# Patient Record
Sex: MALE | Race: BLACK OR AFRICAN AMERICAN | NOT HISPANIC OR LATINO | Employment: OTHER | URBAN - METROPOLITAN AREA
[De-identification: names, ages, dates, MRNs, and addresses within clinical notes are randomized per-mention and may not be internally consistent; named-entity substitution may affect disease eponyms.]

---

## 2023-06-16 ENCOUNTER — TELEPHONE (OUTPATIENT)
Dept: PULMONOLOGY | Facility: CLINIC | Age: 68
End: 2023-06-16
Payer: OTHER GOVERNMENT

## 2023-06-16 NOTE — TELEPHONE ENCOUNTER
Attempted to return no answer left voicemail to return call.LB  ----- Message from Velma Rai sent at 6/16/2023  9:03 AM CDT -----  Contact: Ilana([facility])  Ilana called to consult with nurse or staff regarding an authorization for the patient. She wanted to verify if the authorization has been received so the patient can get scheduled for an appointment. She would like a call back and can be reached at [phone]. Thanks/MR

## 2023-06-22 ENCOUNTER — HOSPITAL ENCOUNTER (OUTPATIENT)
Dept: RADIOLOGY | Facility: HOSPITAL | Age: 68
Discharge: HOME | End: 2023-06-22
Attending: SURGERY
Payer: OTHER GOVERNMENT

## 2023-06-22 ENCOUNTER — HOSPITAL ENCOUNTER (OUTPATIENT)
Dept: PREADMISSION TESTING | Facility: HOSPITAL | Age: 68
Discharge: HOME | End: 2023-06-22
Payer: MEDICARE

## 2023-06-22 VITALS — BODY MASS INDEX: 25.88 KG/M2 | HEIGHT: 66 IN | WEIGHT: 161 LBS

## 2023-06-22 DIAGNOSIS — Z12.11 COLON CANCER SCREENING: Primary | ICD-10-CM

## 2023-06-22 PROCEDURE — 93010 EKG 12-LEAD: ICD-10-PCS | Mod: ,,, | Performed by: INTERNAL MEDICINE

## 2023-06-22 PROCEDURE — 71046 X-RAY EXAM CHEST 2 VIEWS: CPT | Mod: TC

## 2023-06-22 PROCEDURE — 93010 ELECTROCARDIOGRAM REPORT: CPT | Mod: ,,, | Performed by: INTERNAL MEDICINE

## 2023-06-22 PROCEDURE — 93005 ELECTROCARDIOGRAM TRACING: CPT

## 2023-06-22 RX ORDER — SODIUM CHLORIDE, SODIUM LACTATE, POTASSIUM CHLORIDE, CALCIUM CHLORIDE 600; 310; 30; 20 MG/100ML; MG/100ML; MG/100ML; MG/100ML
INJECTION, SOLUTION INTRAVENOUS CONTINUOUS
Status: CANCELLED | OUTPATIENT
Start: 2023-06-28

## 2023-06-22 NOTE — DISCHARGE INSTRUCTIONS

## 2023-06-26 ENCOUNTER — TELEPHONE (OUTPATIENT)
Dept: PULMONOLOGY | Facility: CLINIC | Age: 68
End: 2023-06-26
Payer: OTHER GOVERNMENT

## 2023-06-26 NOTE — TELEPHONE ENCOUNTER
Attempted to return call while leaving voicemail patient picked up.LB  ----- Message from Martina Jaeger sent at 6/26/2023  1:36 PM CDT -----  Type:  Needs Medical Advice    Who Called: Zeferino Mclain Jr.  Symptoms (please be specific): -   How long has patient had these symptoms:  -  Pharmacy name and phone #:  -  Would the patient rather a call back or a response via [facility]?    Best Call Back Number: [phone]    Additional Information: pt would like to make appt. Please call

## 2023-06-27 ENCOUNTER — ANESTHESIA EVENT (OUTPATIENT)
Dept: GASTROENTEROLOGY | Facility: HOSPITAL | Age: 68
End: 2023-06-27
Payer: OTHER GOVERNMENT

## 2023-06-27 DIAGNOSIS — R91.1 LUNG NODULE: Primary | ICD-10-CM

## 2023-06-27 NOTE — ANESTHESIA PREPROCEDURE EVALUATION
06/27/2023  Zeferino Mclain Jr. is a 67 y.o., male.      Pre-op Assessment    I have reviewed the Patient Summary Reports.     I have reviewed the Nursing Notes. I have reviewed the NPO Status.   I have reviewed the Medications.     Review of Systems  Anesthesia Hx:  No problems with previous Anesthesia  Denies Family Hx of Anesthesia complications.   Denies Personal Hx of Anesthesia complications.   Hematology/Oncology:  Hematology Normal   Oncology Normal     EENT/Dental:EENT/Dental Normal   Cardiovascular:  Cardiovascular Normal Exercise tolerance: good     Pulmonary:  Pulmonary Normal    Renal/:  Renal/ Normal     Hepatic/GI:  Hepatic/GI Normal    Musculoskeletal:  Musculoskeletal Normal    Neurological:  Neurology Normal    Endocrine:  Endocrine Normal    Dermatological:  Skin Normal    Psych:  Psychiatric Normal           Physical Exam  General: Well nourished, Cooperative, Alert and Oriented    Airway:  Mallampati: II / II  Mouth Opening: Normal  TM Distance: Normal  Tongue: Normal  Neck ROM: Normal ROM    Dental:  Edentulous        Anesthesia Plan  Type of Anesthesia, risks & benefits discussed:    Anesthesia Type: MAC  Intra-op Monitoring Plan: Standard ASA Monitors  Post Op Pain Control Plan:   (medical reason for not using multimodal pain management)  Induction:  IV  Informed Consent: Patient consented to blood products? No  ASA Score: 3  Day of Surgery Review of History & Physical: H&P Update referred to the surgeon/provider.I have interviewed and examined the patient. I have reviewed the patient's H&P dated: There are no significant changes. H&P completed by Anesthesiologist.    Ready For Surgery From Anesthesia Perspective.     .

## 2023-06-28 ENCOUNTER — HOSPITAL ENCOUNTER (OUTPATIENT)
Dept: GASTROENTEROLOGY | Facility: HOSPITAL | Age: 68
Discharge: HOME | End: 2023-06-28
Attending: SURGERY
Payer: OTHER GOVERNMENT

## 2023-06-28 ENCOUNTER — ANESTHESIA (OUTPATIENT)
Dept: GASTROENTEROLOGY | Facility: HOSPITAL | Age: 68
End: 2023-06-28
Payer: OTHER GOVERNMENT

## 2023-06-28 VITALS
SYSTOLIC BLOOD PRESSURE: 130 MMHG | RESPIRATION RATE: 18 BRPM | BODY MASS INDEX: 25.88 KG/M2 | DIASTOLIC BLOOD PRESSURE: 57 MMHG | HEART RATE: 43 BPM | OXYGEN SATURATION: 99 % | HEIGHT: 66 IN | WEIGHT: 161 LBS | TEMPERATURE: 97 F

## 2023-06-28 DIAGNOSIS — Z12.11 COLON CANCER SCREENING: Primary | ICD-10-CM

## 2023-06-28 PROCEDURE — 37000009 HC ANESTHESIA EA ADD 15 MINS

## 2023-06-28 PROCEDURE — 63600175 PHARM REV CODE 636 W HCPCS: Performed by: NURSE ANESTHETIST, CERTIFIED REGISTERED

## 2023-06-28 PROCEDURE — 00811 ANES LWR INTST NDSC NOS: CPT

## 2023-06-28 PROCEDURE — 37000008 HC ANESTHESIA 1ST 15 MINUTES

## 2023-06-28 PROCEDURE — 25000003 PHARM REV CODE 250: Performed by: NURSE ANESTHETIST, CERTIFIED REGISTERED

## 2023-06-28 PROCEDURE — D9220A PRA ANESTHESIA: Mod: 33,,, | Performed by: NURSE ANESTHETIST, CERTIFIED REGISTERED

## 2023-06-28 PROCEDURE — 45385 COLONOSCOPY W/LESION REMOVAL: CPT | Mod: PT | Performed by: SURGERY

## 2023-06-28 PROCEDURE — D9220A PRA ANESTHESIA: ICD-10-PCS | Mod: 33,,, | Performed by: NURSE ANESTHETIST, CERTIFIED REGISTERED

## 2023-06-28 PROCEDURE — 63600175 PHARM REV CODE 636 W HCPCS: Performed by: SURGERY

## 2023-06-28 RX ORDER — SODIUM CHLORIDE, SODIUM LACTATE, POTASSIUM CHLORIDE, CALCIUM CHLORIDE 600; 310; 30; 20 MG/100ML; MG/100ML; MG/100ML; MG/100ML
INJECTION, SOLUTION INTRAVENOUS CONTINUOUS
Status: DISCONTINUED | OUTPATIENT
Start: 2023-06-28 | End: 2023-06-29 | Stop reason: HOSPADM

## 2023-06-28 RX ORDER — SODIUM CHLORIDE 9 MG/ML
INJECTION, SOLUTION INTRAVENOUS CONTINUOUS
Status: DISCONTINUED | OUTPATIENT
Start: 2023-06-28 | End: 2023-06-29 | Stop reason: HOSPADM

## 2023-06-28 RX ORDER — PROPOFOL 10 MG/ML
VIAL (ML) INTRAVENOUS
Status: DISCONTINUED | OUTPATIENT
Start: 2023-06-28 | End: 2023-06-28

## 2023-06-28 RX ORDER — LIDOCAINE HYDROCHLORIDE 20 MG/ML
INJECTION INTRAVENOUS
Status: DISCONTINUED | OUTPATIENT
Start: 2023-06-28 | End: 2023-06-28

## 2023-06-28 RX ADMIN — PROPOFOL 150 MG: 10 INJECTION, EMULSION INTRAVENOUS at 11:06

## 2023-06-28 RX ADMIN — LIDOCAINE HYDROCHLORIDE 100 MG: 20 INJECTION, SOLUTION INTRAVENOUS at 11:06

## 2023-06-28 RX ADMIN — SODIUM CHLORIDE, POTASSIUM CHLORIDE, SODIUM LACTATE AND CALCIUM CHLORIDE: 600; 310; 30; 20 INJECTION, SOLUTION INTRAVENOUS at 08:06

## 2023-06-28 NOTE — ANESTHESIA POSTPROCEDURE EVALUATION
Anesthesia Post Evaluation    Patient: Zeferino Mclain Jr.    Procedure(s) Performed: * No procedures listed *    Final Anesthesia Type: MAC      Patient location during evaluation: floor  Patient participation: Yes- Able to Participate  Level of consciousness: awake and alert, awake and oriented  Post-procedure vital signs: reviewed and stable  Pain management: adequate  Airway patency: patent    PONV status at discharge: No PONV  Anesthetic complications: no      Cardiovascular status: blood pressure returned to baseline  Respiratory status: unassisted, room air and spontaneous ventilation  Hydration status: euvolemic  Follow-up not needed.          Vitals Value Taken Time   /90 06/28/23 0825   Temp 36 °C (96.8 °F) 06/28/23 0825   Pulse 56 06/28/23 0825   Resp 18 06/28/23 0825   SpO2 97 % 06/28/23 0825         No case tracking events are documented in the log.      Pain/Breanna Score: No data recorded

## 2023-06-28 NOTE — PLAN OF CARE
Returned to room per stretcher in good condition. Awake and alert. No complaints. Passing gas, iced water given and tolerating well.

## 2023-06-28 NOTE — DISCHARGE INSTRUCTIONS
Follow-up with Dr Tracy as scheduled    Diet: as tolerated    Activity:  decrease activity today, no driving today, resume all activity tomorrow    Notify MD:  increased swelling of abdomen, excessive nausea/vomiting, excessive bright red bleeding from rectum    Medications:  continue your home medications. Keep a list of your home medications at all times for emergencies.

## 2023-07-03 NOTE — DISCHARGE SUMMARY
[facility]  Discharge Note  Short Stay    Colonoscopy      OUTCOME: Patient tolerated treatment/procedure well without complication and is now ready for discharge.    DISPOSITION: Home or Self Care    FINAL DIAGNOSIS:  Colon cancer screening    FOLLOWUP: In clinic    DISCHARGE INSTRUCTIONS:    Discharge Procedure Orders   Diet general         Clinical Reference Documents Added to Patient Instructions         Document    COLONOSCOPY (ENGLISH)            TIME SPENT ON DISCHARGE: 5 minutes

## 2023-07-05 ENCOUNTER — CLINICAL SUPPORT (OUTPATIENT)
Dept: PULMONOLOGY | Facility: CLINIC | Age: 68
End: 2023-07-05
Payer: OTHER GOVERNMENT

## 2023-07-05 ENCOUNTER — OFFICE VISIT (OUTPATIENT)
Dept: PULMONOLOGY | Facility: CLINIC | Age: 68
End: 2023-07-05
Payer: OTHER GOVERNMENT

## 2023-07-05 ENCOUNTER — TELEPHONE (OUTPATIENT)
Dept: PULMONOLOGY | Facility: CLINIC | Age: 68
End: 2023-07-05

## 2023-07-05 VITALS
WEIGHT: 162 LBS | HEART RATE: 56 BPM | DIASTOLIC BLOOD PRESSURE: 70 MMHG | HEIGHT: 66 IN | SYSTOLIC BLOOD PRESSURE: 178 MMHG | RESPIRATION RATE: 17 BRPM | BODY MASS INDEX: 26.03 KG/M2 | OXYGEN SATURATION: 17 %

## 2023-07-05 DIAGNOSIS — R91.8 PULMONARY NODULES/LESIONS, MULTIPLE: Primary | ICD-10-CM

## 2023-07-05 DIAGNOSIS — R91.1 SOLITARY PULMONARY NODULE: ICD-10-CM

## 2023-07-05 DIAGNOSIS — J47.9 FOCAL BRONCHIECTASIS: ICD-10-CM

## 2023-07-05 DIAGNOSIS — Z87.891 HISTORY OF TOBACCO USE: ICD-10-CM

## 2023-07-05 DIAGNOSIS — R91.1 LUNG NODULE: ICD-10-CM

## 2023-07-05 PROCEDURE — 94729 DIFFUSING CAPACITY: CPT | Mod: S$GLB,,, | Performed by: INTERNAL MEDICINE

## 2023-07-05 PROCEDURE — 94726 PULM FUNCT TST PLETHYSMOGRAP: ICD-10-PCS | Mod: S$GLB,,, | Performed by: INTERNAL MEDICINE

## 2023-07-05 PROCEDURE — 94060 EVALUATION OF WHEEZING: CPT | Mod: S$GLB,,, | Performed by: INTERNAL MEDICINE

## 2023-07-05 PROCEDURE — 94726 PLETHYSMOGRAPHY LUNG VOLUMES: CPT | Mod: S$GLB,,, | Performed by: INTERNAL MEDICINE

## 2023-07-05 PROCEDURE — 99205 PR OFFICE/OUTPT VISIT, NEW, LEVL V, 60-74 MIN: ICD-10-PCS | Mod: 25,S$GLB,, | Performed by: INTERNAL MEDICINE

## 2023-07-05 PROCEDURE — 99205 OFFICE O/P NEW HI 60 MIN: CPT | Mod: 25,S$GLB,, | Performed by: INTERNAL MEDICINE

## 2023-07-05 PROCEDURE — 94060 PR EVAL OF BRONCHOSPASM: ICD-10-PCS | Mod: S$GLB,,, | Performed by: INTERNAL MEDICINE

## 2023-07-05 PROCEDURE — 94729 PR C02/MEMBANE DIFFUSE CAPACITY: ICD-10-PCS | Mod: S$GLB,,, | Performed by: INTERNAL MEDICINE

## 2023-07-05 RX ORDER — PNV NO.95/FERROUS FUM/FOLIC AC 28MG-0.8MG
100 TABLET ORAL EVERY MORNING
COMMUNITY

## 2023-07-05 RX ORDER — AMOXICILLIN 500 MG
1 CAPSULE ORAL EVERY MORNING
COMMUNITY

## 2023-07-05 NOTE — PROGRESS NOTES
Subjective:    Patient Identification:   Patient ID: Zeferino Mclain Jr. is a 67 y.o. male.    Referring Provider:  [facility]    Chief Complaint:  lung nodules      History of Present Illness:    Zeferino Mclain Jr. is a 67 y.o. male who presents for the evaluation of incidentally found lung nodules.    Patient has history of tobacco abuse and smoked around 1 pack that would last him for 2 days and did for 30 years.  He quit smoking about a year ago.  He denies any respiratory issues such as shortness of breath, cough or wheezing.  He denies any fever, chills, night sweats, fatigue, loss of appetite etcetera.  He is pretty active and does exercise.    His father had lung cancer and was a smoker.  Father has worked in rice farm as well where he was exposed to several chemicals.    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  He has also operative [employer] mostly at work and worked as a .  He is currently retired.  He gives a history of walking pneumonia several years ago but no recurrent bronchitis or infections.  He denies any symptoms consistent with aspiration.  He is from [place] but has worked in [employer] most of his life.  In 1975 to 1977 he was stationed in [place].  He is very healthy and denies taking any medications.  Denies any history of connective tissue disease or joint swelling or pain or rash etcetera.    No history of incarceration or exposure to TB.  No family history of connective tissue disease or sarcoidosis either.      Review of Systems:  Review of Systems   Constitutional:  Negative for fever, chills, weight loss, weight gain, activity change, appetite change, fatigue, night sweats and weakness.   HENT:  Negative for nosebleeds, postnasal drip, rhinorrhea, sinus pressure, sore throat, trouble swallowing, voice change, congestion, ear pain and hearing loss.    Eyes:  Negative for redness and itching.   Respiratory:  Negative for cough, hemoptysis,  sputum production, choking, chest tightness, shortness of breath, wheezing, orthopnea, previous hospitialization due to pulmonary problems, asthma nighttime symptoms, pleurisy, dyspnea on extertion, use of rescue inhaler and Paroxysmal Nocturnal Dyspnea.    Cardiovascular:  Negative for chest pain, palpitations and leg swelling.   Genitourinary:  Negative for difficulty urinating and hematuria.   Endocrine:  Negative for polydipsia, polyphagia, cold intolerance, heat intolerance and polyuria.    Musculoskeletal:  Negative for arthralgias, back pain, gait problem, joint swelling and myalgias.   Skin:  Negative for rash.   Gastrointestinal:  Negative for nausea, vomiting, abdominal pain, abdominal distention and acid reflux.   Neurological:  Negative for dizziness, syncope, weakness, light-headedness and headaches.   Hematological:  Negative for adenopathy. Does not bruise/bleed easily and no excessive bruising.   Psychiatric/Behavioral:  Negative for confusion and sleep disturbance. The patient is not nervous/anxious.        Allergies:   Review of patient's allergies indicates:   Allergen Reactions    Pcn [penicillins] Rash       Medications:      Past Medical History:      History reviewed. No pertinent past medical history.    Family History:      History reviewed. No pertinent family history.     Social History:      History reviewed. No pertinent surgical history.    Physical Exam:  Vitals:    07/05/23 0857   BP: (!) 178/70   Pulse: (!) 56   Resp: 17     Physical Exam   Constitutional: He is oriented to person, place, and time. He appears not cachectic. No distress.   HENT:   Head: Normocephalic.   Right Ear: External ear normal.   Left Ear: External ear normal.   Nose: Nose normal. No mucosal edema. No polyps.   Mouth/Throat: Oropharynx is clear and moist. Normal dentition. No oropharyngeal exudate.   Neck: No JVD present. No tracheal deviation present. No thyromegaly present.   Cardiovascular: Normal rate,  regular rhythm, normal heart sounds and intact distal pulses. Exam reveals no gallop and no friction rub.   No murmur heard.  Pulmonary/Chest: Normal expansion, symmetric chest wall expansion, effort normal and breath sounds normal. No stridor. No respiratory distress. He has no decreased breath sounds. He has no wheezes. He has no rhonchi. He has no rales. Chest wall is not dull to percussion. He exhibits no tenderness. Negative for egophony. Negative for tactile fremitus.   Abdominal: Soft. Bowel sounds are normal. He exhibits no distension and no mass. There is no hepatosplenomegaly. There is no abdominal tenderness. There is no rebound and no guarding. No hernia.   Musculoskeletal:         General: No tenderness, deformity or edema. Normal range of motion.      Cervical back: Normal range of motion and neck supple.   Lymphadenopathy: No supraclavicular adenopathy is present.     He has no cervical adenopathy.     He has no axillary adenopathy.   Neurological: He is alert and oriented to person, place, and time. He has normal reflexes. He displays normal reflexes. No cranial nerve deficit. He exhibits normal muscle tone.   Skin: Skin is warm and dry. No rash noted. He is not diaphoretic. No cyanosis or erythema. No pallor. Nails show no clubbing.   Psychiatric: He has a normal mood and affect. His behavior is normal. Judgment and thought content normal.          Accessory Clinical Data:  Chest x-ray:    CT scan: A CT chest showed a 1.6 x 1.4 cm noncalcified nodule in the right suprahilar soft tissue with surrounding small nodules.  There were multiple peribronchial nodules within the right middle lobe and the right upper lobe with right middle lobe bronchiectasis.  These findings were concerning for bronchiolitis and a three-month follow-up was recommended after treatment.      For some reason patient underwent a PET-CT chest on 06/16/2023 which showed right upper chest central 12 mm irregular noncalcified nodule  with abnormal tracer uptake of 6.1 SUV.  Medially located irregular noncalcified soft tissue nodule about 15 mm in size also with abnormal SUV of 6.3.  A peripheral 18 mm irregular noncalcified nodule in mid right lung with SUV of 4.4.  Abnormal right hilar uptake without CT correlate was also seen.    PFTs:  PFTs are essentially normal.    6MWT:     TTE:    Lab data:    All radiographic imaging of the chest, PFT tracings/data, and 6MWT data have been independently reviewed and interpreted unless otherwise specified.     Assessment and Plan:        Problem List Items Addressed This Visit    None  Visit Diagnoses       Pulmonary nodules/lesions, multiple    -  Primary    Relevant Orders    Fungal Immunodiffusion - Blood    Angiotensin Converting Enzyme    Sedimentation rate    Histoplasma antigen, urine    Solitary pulmonary nodule        Relevant Orders    CT Chest Without Contrast    Focal bronchiectasis        History of tobacco use               Orders Placed This Encounter   Procedures    CT Chest Without Contrast     Standing Status:   Future     Number of Occurrences:   1     Standing Expiration Date:   7/5/2024     Scheduling Instructions:      Needs ION-protocol     Order Specific Question:   May the Radiologist modify the order per protocol to meet the clinical needs of the patient?     Answer:   Yes    Fungal Immunodiffusion - Blood     Standing Status:   Future     Number of Occurrences:   1     Standing Expiration Date:   9/2/2024    Angiotensin Converting Enzyme     Standing Status:   Future     Number of Occurrences:   1     Standing Expiration Date:   9/2/2024    Sedimentation rate     Standing Status:   Future     Number of Occurrences:   1     Standing Expiration Date:   9/2/2024    Histoplasma antigen, urine     Order Specific Question:   Specimen Source     Answer:   Urine      Differential for these findings is broad.  Major differential is early granulomatous disease, atypical mycobacterial or  old fungal infection and malignancy.  Unfortunately, a PET scan was ordered after abnormal CT chest.  An individualized approach to these nodules should have been to work them up as infectious in nature and treat them 1st.  Nevertheless, I am going to order some fungal serologies, Ace level and sed rate.  No history of connective tissue disease.  No history of exposure to TB or symptomatology.  Since there is strong history of family history of lung cancer as well as patient's personal history of smoking, we will get a NODIFY test and obtain a noncontrasted iron protocol CT chest and proceed with robotic bronchoscopy.  We will plan on doing microbiology with special focus on atypical mycobacterial and fungal infections during bronchoscopy as well.    More than 50% of 60 min time was spent face-to-face on counseling, in reviewing the imaging studies, reviewing notes from primary care provider, performing appropriate examination, counseling and educating the patient regarding the findings on CT scan, ordering medications and appropriate follow-up imaging studies, documenting clinical information in the electronic medical record, care coordination as well as communicating with the primary referring physician.      Follow up for Biopsy results are available.  Thank you very much for involving me in the care of this patient.  Please do not hesitate to reach me if you have any further questions or concerns.    This note is dictated on M*Modal word recognition program.  There are word recognition mistakes that are occasionally missed on review.

## 2023-07-07 ENCOUNTER — TELEPHONE (OUTPATIENT)
Dept: PULMONOLOGY | Facility: CLINIC | Age: 68
End: 2023-07-07
Payer: OTHER GOVERNMENT

## 2023-07-07 LAB — ERYTHROCYTE [SEDIMENTATION RATE] IN BLOOD: 16 MM/HR (ref 0–15)

## 2023-07-07 NOTE — TELEPHONE ENCOUNTER
Attempted to call this number from several phones all gave recording we can not complete your call. Also called Luda with Auth department thinking this is one of her people no answer left voicemail. LB  ----- Message from Marisol Lantigua sent at 7/7/2023 10:39 AM CDT -----  Contact: Vitor Bennett  Is calling to get the CPT code for the pt for navigational broncoscopy and can be reached at [phone]//thanks/dbw

## 2023-07-11 ENCOUNTER — OUTSIDE PLACE OF SERVICE (OUTPATIENT)
Dept: PULMONOLOGY | Facility: CLINIC | Age: 68
End: 2023-07-11
Payer: OTHER GOVERNMENT

## 2023-07-11 DIAGNOSIS — A49.8 PSEUDOMONAS INFECTION: Primary | ICD-10-CM

## 2023-07-11 LAB
ABS NRBC COUNT: 0 THOU/UL (ref 0–0.01)
ABSOLUTE BASOPHIL: 0.1 10*3/UL (ref 0–0.3)
ABSOLUTE EOSINOPHIL: 0.1 10*3/UL (ref 0–0.6)
ABSOLUTE IMMATURE GRAN: 0.03 THOU/UL (ref 0–0.03)
ABSOLUTE LYMPHOCYTE: 1.1 10*3/UL (ref 1.2–4)
ABSOLUTE MONOCYTE: 0.7 10*3/UL (ref 0.1–0.8)
ACE SERPL-CCNC: 24 U/L (ref 16–85)
ALBUMIN SERPL BCP-MCNC: 3.8 G/DL (ref 3.4–5)
ALP SERPL-CCNC: 106 U/L (ref 45–117)
ALT SERPL W P-5'-P-CCNC: 17 U/L (ref 16–61)
ANION GAP SERPL CALC-SCNC: 5 MMOL/L (ref 3–11)
APTT PPP: 30.7 SEC (ref 25.8–38.6)
AST SERPL-CCNC: 16 U/L (ref 15–37)
BASOPHILS NFR BLD: 0.7 % (ref 0–3)
BILIRUB SERPL-MCNC: 0.5 MG/DL (ref 0.2–1)
BUN SERPL-MCNC: 14 MG/DL (ref 7–18)
BUN/CREAT SERPL: 15.73 RATIO
CALCIUM SERPL-MCNC: 8.8 MG/DL (ref 8.5–10.1)
CHLORIDE SERPL-SCNC: 104 MMOL/L (ref 98–107)
CO2 SERPL-SCNC: 27 MMOL/L (ref 21–32)
CREAT SERPL-MCNC: 0.89 MG/DL (ref 0.7–1.3)
EOSINOPHIL NFR BLD: 0.7 % (ref 0–6)
ERYTHROCYTE [DISTWIDTH] IN BLOOD BY AUTOMATED COUNT: 14.1 % (ref 0–15.5)
GFR ESTIMATION: > 60
GLUCOSE SERPL-MCNC: 106 MG/DL (ref 74–106)
HCT VFR BLD AUTO: 37.5 % (ref 42–52)
HGB BLD-MCNC: 12.9 G/DL (ref 14–18)
IMMATURE GRANULOCYTES: 0.4 % (ref 0–0.43)
IMMATURE PLATELET FRACTION: 16 % (ref 0–8.6)
INR PPP: 1.2 INR (ref 0.9–1.1)
LYMPHOCYTES NFR BLD: 15.6 % (ref 20–45)
MCH RBC QN AUTO: 27.5 PG (ref 27–32)
MCHC RBC AUTO-ENTMCNC: 34.4 % (ref 32–36)
MCV RBC AUTO: 80 FL (ref 80–97)
MONOCYTES NFR BLD: 9.6 % (ref 2–10)
NEUTROPHILS # BLD AUTO: 5 10*3/UL (ref 1.4–7)
NEUTROPHILS NFR BLD: 73 % (ref 50–80)
NUCLEATED RED BLOOD CELLS: 0 % (ref 0–0.2)
PLATELET MORPHOLOGY: NORMAL
PLATELETS: 139 10*3/UL (ref 130–400)
PMV BLD AUTO: 13.6 FL (ref 9.2–12.2)
POTASSIUM SERPL-SCNC: 3.9 MMOL/L (ref 3.5–5.1)
PROT SERPL-MCNC: 8.3 G/DL (ref 6.4–8.2)
PROTHROMBIN TIME: 13.5 SEC (ref 10.2–12.9)
RBC # BLD AUTO: 4.69 10*6/UL (ref 4.7–6.1)
SMALL PLATELETS BLD QL SMEAR: NORMAL
SODIUM BLD-SCNC: 136 MMOL/L (ref 131–143)
WBC # BLD: 6.9 10*3/UL (ref 4.5–10)

## 2023-07-11 PROCEDURE — 31623 PR BRONCHOSCOPY,DIAGNOSTIC W BRUSH: ICD-10-PCS | Mod: 51,RT,, | Performed by: INTERNAL MEDICINE

## 2023-07-11 PROCEDURE — 31624 PR BRONCHOSCOPY,DIAG2STIC W LAVAGE: ICD-10-PCS | Mod: 59,RT,, | Performed by: INTERNAL MEDICINE

## 2023-07-11 PROCEDURE — 31654 PR BRONCH W/ EBUS, DIAG OR THERA INTERVENTION PERIPHERAL LESION(S), INCL GUID, ADD ON CODE: ICD-10-PCS | Mod: ,,, | Performed by: INTERNAL MEDICINE

## 2023-07-11 PROCEDURE — 31654 BRONCH EBUS IVNTJ PERPH LES: CPT | Mod: ,,, | Performed by: INTERNAL MEDICINE

## 2023-07-11 PROCEDURE — 31623 DX BRONCHOSCOPE/BRUSH: CPT | Mod: 51,RT,, | Performed by: INTERNAL MEDICINE

## 2023-07-11 PROCEDURE — 31628 BRONCHOSCOPY/LUNG BX EACH: CPT | Mod: 51,RT,, | Performed by: INTERNAL MEDICINE

## 2023-07-11 PROCEDURE — 31633 BRONCHOSCOPY/NEEDLE BX ADDL: CPT | Mod: ,,, | Performed by: INTERNAL MEDICINE

## 2023-07-11 PROCEDURE — 31624 DX BRONCHOSCOPE/LAVAGE: CPT | Mod: 59,RT,, | Performed by: INTERNAL MEDICINE

## 2023-07-11 PROCEDURE — 31627 NAVIGATIONAL BRONCHOSCOPY: CPT | Mod: ,,, | Performed by: INTERNAL MEDICINE

## 2023-07-11 PROCEDURE — 31633 PR BRONCHOSCOPY/NEEDLE BX ADD'L: ICD-10-PCS | Mod: ,,, | Performed by: INTERNAL MEDICINE

## 2023-07-11 PROCEDURE — 31653 BRONCH EBUS SAMPLNG 3/> NODE: CPT | Mod: ,,, | Performed by: INTERNAL MEDICINE

## 2023-07-11 PROCEDURE — 31629 BRONCHOSCOPY/NEEDLE BX EACH: CPT | Mod: 59,RT,, | Performed by: INTERNAL MEDICINE

## 2023-07-11 PROCEDURE — 31653 PR BRONCH W/ EBUS, SAMPLING 3 OR MORE NODES, INCL GUIDE: ICD-10-PCS | Mod: ,,, | Performed by: INTERNAL MEDICINE

## 2023-07-11 PROCEDURE — 31627 PR BRONCHOSCOPY,COMPUTER ASSIST/IMAGE-GUIDED NAVIGATION: ICD-10-PCS | Mod: ,,, | Performed by: INTERNAL MEDICINE

## 2023-07-11 PROCEDURE — 31628 PR BRONCHOSCOPY,TRANSBRONCH BIOPSY: ICD-10-PCS | Mod: 51,RT,, | Performed by: INTERNAL MEDICINE

## 2023-07-11 PROCEDURE — 31629 PR BRONCHOSCOPY,TRANSBRON ASPIR BX: ICD-10-PCS | Mod: 59,RT,, | Performed by: INTERNAL MEDICINE

## 2023-07-12 ENCOUNTER — TELEPHONE (OUTPATIENT)
Dept: PULMONOLOGY | Facility: CLINIC | Age: 68
End: 2023-07-12
Payer: OTHER GOVERNMENT

## 2023-07-12 NOTE — TELEPHONE ENCOUNTER
Return call and spoke with patient. LB  ----- Message from Haley Grimaldo sent at 7/12/2023  1:04 PM CDT -----  Contact: self  Type: Staff Message    Caller: Zeferino Mclain Jr.    Call Back Number: [phone]    Nature of the Call: Missed a call from [facility] lab for an order from Dr. Bernardo. Please advise patient.  Additional Information: na

## 2023-07-13 LAB
GRAM STAIN (RESPIRATORY): NORMAL
GRAM STAIN: NORMAL
Lab: NORMAL
PSEUDOMONAS AERUGINOSA: NORMAL
RESPIRATORY CULTURE: NORMAL
SPECIMEN TO PATHOLOGY: NORMAL
SPECIMEN TO PATHOLOGY: NORMAL

## 2023-07-14 ENCOUNTER — LAB VISIT (OUTPATIENT)
Dept: LAB | Facility: HOSPITAL | Age: 68
End: 2023-07-14
Attending: SURGERY
Payer: OTHER GOVERNMENT

## 2023-07-14 DIAGNOSIS — C20 RECTAL CANCER: Primary | ICD-10-CM

## 2023-07-14 LAB — CEA SERPL-MCNC: <1.73 NG/ML (ref 0–3)

## 2023-07-14 PROCEDURE — 82378 CARCINOEMBRYONIC ANTIGEN: CPT

## 2023-07-14 PROCEDURE — 36415 COLL VENOUS BLD VENIPUNCTURE: CPT

## 2023-07-14 RX ORDER — LEVOFLOXACIN 750 MG/1
750 TABLET ORAL DAILY
Qty: 14 TABLET | Refills: 0 | OUTPATIENT
Start: 2023-07-14 | End: 2023-07-28

## 2023-07-16 LAB
CULTURE TISSUE AEROBE: NORMAL
CULTURE TISSUE AEROBE: NORMAL
CULTURE TISSUE ANAEROBE: NORMAL
GRAM STAIN, TISSUE: NORMAL
PSEUDOMONAS AERUGINOSA: NORMAL

## 2023-07-18 LAB — SPECIMEN TO PATHOLOGY: NORMAL

## 2023-07-25 ENCOUNTER — TELEPHONE (OUTPATIENT)
Dept: HEMATOLOGY/ONCOLOGY | Facility: CLINIC | Age: 68
End: 2023-07-25
Payer: OTHER GOVERNMENT

## 2023-07-26 ENCOUNTER — TELEPHONE (OUTPATIENT)
Dept: HEMATOLOGY/ONCOLOGY | Facility: CLINIC | Age: 68
End: 2023-07-26
Payer: OTHER GOVERNMENT

## 2023-07-26 NOTE — TELEPHONE ENCOUNTER
Spoke to Surinder at [facility]. Appt date and time given. She will contact patient to let him know when it is scheduled and will call back if there are any issues.     ----- Message from Velma Rai sent at 7/26/2023  8:30 AM CDT -----  Contact: Surinder([facility])  Surinder called to consult with nurse or staff regarding the patients authorization. She state the authorization was sent on July 21st and wanted to know if the office had received it. She would like a call back regarding this and can be reached at [phone]. Thanks/MR

## 2023-07-28 ENCOUNTER — HOSPITAL ENCOUNTER (OUTPATIENT)
Dept: RADIOLOGY | Facility: HOSPITAL | Age: 68
Discharge: HOME | End: 2023-07-28
Attending: SURGERY
Payer: OTHER GOVERNMENT

## 2023-07-28 DIAGNOSIS — C20 MALIGNANT NEOPLASM OF RECTUM: ICD-10-CM

## 2023-07-28 PROCEDURE — 72197 MRI PELVIS W/O & W/DYE: CPT | Mod: TC

## 2023-07-28 PROCEDURE — A9577 INJ MULTIHANCE: HCPCS | Performed by: SURGERY

## 2023-07-28 PROCEDURE — 25500020 PHARM REV CODE 255: Performed by: SURGERY

## 2023-07-28 RX ADMIN — GADOBENATE DIMEGLUMINE 16 ML: 529 INJECTION, SOLUTION INTRAVENOUS at 02:07

## 2023-08-01 DIAGNOSIS — C20 MALIGNANT NEOPLASM OF RECTUM: Primary | ICD-10-CM

## 2023-08-09 LAB
FUNGUS (MYCOLOGY) CULTURE: NORMAL
FUNGUS (MYCOLOGY) CULTURE: NORMAL
KOH PREP: NORMAL
KOH PREP: NORMAL

## 2023-08-11 ENCOUNTER — OFFICE VISIT (OUTPATIENT)
Dept: HEMATOLOGY/ONCOLOGY | Facility: CLINIC | Age: 68
End: 2023-08-11
Payer: OTHER GOVERNMENT

## 2023-08-11 VITALS
RESPIRATION RATE: 18 BRPM | BODY MASS INDEX: 23.97 KG/M2 | HEIGHT: 66 IN | OXYGEN SATURATION: 97 % | WEIGHT: 149.13 LBS | DIASTOLIC BLOOD PRESSURE: 86 MMHG | SYSTOLIC BLOOD PRESSURE: 177 MMHG

## 2023-08-11 DIAGNOSIS — C20 RECTAL CANCER: Primary | ICD-10-CM

## 2023-08-11 PROCEDURE — 99205 PR OFFICE/OUTPT VISIT, NEW, LEVL V, 60-74 MIN: ICD-10-PCS | Mod: S$GLB,,, | Performed by: INTERNAL MEDICINE

## 2023-08-11 PROCEDURE — 99205 OFFICE O/P NEW HI 60 MIN: CPT | Mod: S$GLB,,, | Performed by: INTERNAL MEDICINE

## 2023-08-11 NOTE — PROGRESS NOTES
MEDICAL ONCOLOGY INITIAL CONSULTATION NOTE    Patient ID: Zeferino Mclain Jr. is a 68 y.o. male.    Chief Complaint:  Rectal Cancer    HPI:  Patient is a 68-year-old male former smoker with recent diagnosis of rectal cancer after he underwent screening colonoscopy which showed findings as below.  Patient presents to our clinic today for further evaluation voices no acute complaints.        Pathology:  2023     A. Pedunculated polyp at 25 cm:  Tubulovillous adenoma    B. Polyp at 2nd wall rectum:   Adenocarcinoma, moderately differentiated    Synoptic report:   Histologic Type adenocarcinoma  Histologic grade:  G2, moderately differentiated  Tumor size: 2 mm  Tumor extent:  Invades lamina propria  Lymphovascular invasion: Not identified  All margins negative for invasive carcinoma    Mismatch repair (MMR) IHC panel:  Mismatch repair (MMR) IHC panel:  MLH1, MSH2, MSH6 and PMS2 all show intact nuclear expression by immunohistochemistry.  There is no evidence of mismatch repair deficiency in this specimen      Imaging:     PET scan:  2023  Multiple abnormal lung nodules in the upper right paramidline chest medially and 1 in the lower lateral aspect of the right middle lung.  They all show abnormal tracer uptake.  Differential diagnosis may include immature granuloma but malignancy is not excluded  There are no abnormal areas of tracer uptake to suggest malignancy or metastatic disease      History reviewed. No pertinent past medical history.  Family History   Problem Relation Age of Onset    Dementia Mother     Lung cancer Father     Breast cancer Sister      Social History     Socioeconomic History    Marital status:    Tobacco Use    Smoking status: Former     Current packs/day: 0.00     Average packs/day: 0.5 packs/day for 30.0 years (15.0 ttl pk-yrs)     Types: Cigarettes     Start date: 1992     Quit date: 2022     Years since quittin.5     Passive exposure: Past    Smokeless tobacco:  Never   Substance and Sexual Activity    Alcohol use: Not Currently     Comment: SOCIAL    Drug use: Never    Sexual activity: Yes     Partners: Female     Past Surgical History:   Procedure Laterality Date    COLONOSCOPY           Review of systems:  Review of Systems   Constitutional:  Negative for activity change, appetite change, chills, diaphoresis, fatigue and unexpected weight change.   HENT:  Negative for congestion, facial swelling, hearing loss, mouth sores, trouble swallowing and voice change.    Eyes:  Negative for photophobia, pain, discharge and itching.   Respiratory:  Negative for apnea, cough, choking, chest tightness and shortness of breath.    Cardiovascular:  Negative for chest pain, palpitations and leg swelling.   Gastrointestinal:  Negative for abdominal distention, abdominal pain, anal bleeding and blood in stool.   Endocrine: Negative for cold intolerance, heat intolerance, polydipsia and polyphagia.   Genitourinary:  Negative for difficulty urinating, dysuria, flank pain and hematuria.   Musculoskeletal:  Negative for arthralgias, back pain, joint swelling, myalgias, neck pain and neck stiffness.   Skin:  Negative for color change, pallor and wound.   Allergic/Immunologic: Negative for environmental allergies, food allergies and immunocompromised state.   Neurological:  Negative for dizziness, seizures, facial asymmetry, speech difficulty, light-headedness, numbness and headaches.   Hematological:  Negative for adenopathy. Does not bruise/bleed easily.   Psychiatric/Behavioral:  Negative for agitation, behavioral problems, confusion, decreased concentration and sleep disturbance.              Physical Exam  Vitals and nursing note reviewed.   Constitutional:       General: He is not in acute distress.     Appearance: Normal appearance. He is not ill-appearing.   HENT:      Head: Normocephalic and atraumatic.      Nose: No congestion or rhinorrhea.   Eyes:      General: No scleral icterus.      Extraocular Movements: Extraocular movements intact.      Pupils: Pupils are equal, round, and reactive to light.   Cardiovascular:      Rate and Rhythm: Normal rate and regular rhythm.      Pulses: Normal pulses.      Heart sounds: Normal heart sounds. No murmur heard.     No gallop.   Pulmonary:      Effort: Pulmonary effort is normal. No respiratory distress.      Breath sounds: Normal breath sounds. No stridor. No wheezing or rhonchi.   Abdominal:      General: Bowel sounds are normal. There is no distension.      Palpations: There is no mass.      Tenderness: There is no abdominal tenderness. There is no guarding.   Musculoskeletal:         General: No swelling, tenderness, deformity or signs of injury. Normal range of motion.      Cervical back: Normal range of motion and neck supple. No rigidity. No muscular tenderness.      Right lower leg: No edema.      Left lower leg: No edema.   Skin:     General: Skin is warm.      Coloration: Skin is not jaundiced or pale.      Findings: No bruising or lesion.   Neurological:      General: No focal deficit present.      Mental Status: He is alert and oriented to person, place, and time.      Cranial Nerves: No cranial nerve deficit.      Sensory: No sensory deficit.      Motor: No weakness.      Gait: Gait normal.   Psychiatric:         Mood and Affect: Mood normal.         Behavior: Behavior normal.         Thought Content: Thought content normal.                                   Vitals:    08/11/23 0925   BP: (!) 177/86   Resp: 18   Body surface area is 1.77 meters squared.    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  No high-risk features and no lymphovascular invasion. pT1  == Discussed with patient discussed with patient in detail management options for pedunculated polyp with invasive cancer component and recommendation for observation if favorable histologic features and clear  margins pT1 on NCCN guidelines.  == Patient will also be discussed in multidisciplinary tumor board as he is also being followed by Pulmonary for incidental pulmonary nodules noted on PET scan.    Plan:   Continue observation  Tumor board discussion  Follow-up with Pulmonary    Return to clinic in 4 weeks for MD visit with labs:  CBC, CMP, CEA    Total time spent in counseling and discussion about further management options including relevant lab work, treatment,  prognosis, medications and intended side effects was more than 60 minutes. More than 50% of the time was spent on counseling and coordination of care.  I spent a total of 60 minutes on the day of the visit.This includes face to face time and non-face to face time preparing to see the patient (eg, review of tests), Obtaining and/or reviewing separately obtained history, Documenting clinical information in the electronic or other health record, Independently interpreting resultsand communicating results to the patient/family/caregiver, or Care coordination.

## 2023-08-14 ENCOUNTER — TELEPHONE (OUTPATIENT)
Dept: HEMATOLOGY/ONCOLOGY | Facility: CLINIC | Age: 68
End: 2023-08-14
Payer: OTHER GOVERNMENT

## 2023-08-14 ENCOUNTER — HOSPITAL ENCOUNTER (OUTPATIENT)
Dept: RADIOLOGY | Facility: HOSPITAL | Age: 68
Discharge: HOME | End: 2023-08-14
Attending: SURGERY
Payer: OTHER GOVERNMENT

## 2023-08-14 DIAGNOSIS — C20 MALIGNANT NEOPLASM OF RECTUM: ICD-10-CM

## 2023-08-14 PROCEDURE — 25500020 PHARM REV CODE 255: Performed by: SURGERY

## 2023-08-14 PROCEDURE — 74178 CT ABD&PLV WO CNTR FLWD CNTR: CPT | Mod: TC

## 2023-08-14 RX ADMIN — IOPAMIDOL 100 ML: 612 INJECTION, SOLUTION INTRAVENOUS at 08:08

## 2023-08-17 DIAGNOSIS — Z22.39 MYCOBACTERIUM AVIUM INTRACELLULARE COLONIZATION: Primary | ICD-10-CM

## 2023-08-21 ENCOUNTER — ANESTHESIA (OUTPATIENT)
Dept: SURGERY | Facility: HOSPITAL | Age: 68
End: 2023-08-21
Payer: MEDICARE

## 2023-08-21 ENCOUNTER — ANESTHESIA EVENT (OUTPATIENT)
Dept: SURGERY | Facility: HOSPITAL | Age: 68
End: 2023-08-21
Payer: MEDICARE

## 2023-08-21 ENCOUNTER — HOSPITAL ENCOUNTER (OUTPATIENT)
Facility: HOSPITAL | Age: 68
Discharge: HOME | End: 2023-08-21
Attending: SURGERY | Admitting: SURGERY
Payer: MEDICARE

## 2023-08-21 VITALS
TEMPERATURE: 98 F | HEART RATE: 60 BPM | BODY MASS INDEX: 25.71 KG/M2 | OXYGEN SATURATION: 98 % | SYSTOLIC BLOOD PRESSURE: 157 MMHG | HEIGHT: 66 IN | DIASTOLIC BLOOD PRESSURE: 85 MMHG | WEIGHT: 160 LBS | RESPIRATION RATE: 18 BRPM

## 2023-08-21 DIAGNOSIS — C20 RECTAL CANCER: ICD-10-CM

## 2023-08-21 PROCEDURE — 37000008 HC ANESTHESIA 1ST 15 MINUTES: Performed by: SURGERY

## 2023-08-21 PROCEDURE — 25000003 PHARM REV CODE 250: Performed by: NURSE ANESTHETIST, CERTIFIED REGISTERED

## 2023-08-21 PROCEDURE — 63600175 PHARM REV CODE 636 W HCPCS: Performed by: ANESTHESIOLOGY

## 2023-08-21 PROCEDURE — 45330 DIAGNOSTIC SIGMOIDOSCOPY: CPT | Performed by: SURGERY

## 2023-08-21 PROCEDURE — D9220A PRA ANESTHESIA: ICD-10-PCS | Mod: ,,, | Performed by: NURSE ANESTHETIST, CERTIFIED REGISTERED

## 2023-08-21 PROCEDURE — D9220A PRA ANESTHESIA: Mod: ,,, | Performed by: NURSE ANESTHETIST, CERTIFIED REGISTERED

## 2023-08-21 PROCEDURE — 63600175 PHARM REV CODE 636 W HCPCS: Performed by: NURSE ANESTHETIST, CERTIFIED REGISTERED

## 2023-08-21 RX ORDER — LIDOCAINE HYDROCHLORIDE 10 MG/ML
1 INJECTION, SOLUTION EPIDURAL; INFILTRATION; INTRACAUDAL; PERINEURAL ONCE
Status: DISCONTINUED | OUTPATIENT
Start: 2023-08-21 | End: 2023-08-21 | Stop reason: HOSPADM

## 2023-08-21 RX ORDER — LIDOCAINE HYDROCHLORIDE 20 MG/ML
INJECTION INTRAVENOUS
Status: DISCONTINUED | OUTPATIENT
Start: 2023-08-21 | End: 2023-08-21

## 2023-08-21 RX ORDER — SODIUM CHLORIDE, SODIUM LACTATE, POTASSIUM CHLORIDE, CALCIUM CHLORIDE 600; 310; 30; 20 MG/100ML; MG/100ML; MG/100ML; MG/100ML
INJECTION, SOLUTION INTRAVENOUS CONTINUOUS
Status: DISCONTINUED | OUTPATIENT
Start: 2023-08-21 | End: 2023-08-21 | Stop reason: HOSPADM

## 2023-08-21 RX ORDER — PROPOFOL 10 MG/ML
VIAL (ML) INTRAVENOUS
Status: DISCONTINUED | OUTPATIENT
Start: 2023-08-21 | End: 2023-08-21

## 2023-08-21 RX ADMIN — PROPOFOL 100 MG: 10 INJECTION, EMULSION INTRAVENOUS at 07:08

## 2023-08-21 RX ADMIN — LIDOCAINE HYDROCHLORIDE 20 MG: 20 INJECTION, SOLUTION INTRAVENOUS at 07:08

## 2023-08-21 RX ADMIN — SODIUM CHLORIDE, POTASSIUM CHLORIDE, SODIUM LACTATE AND CALCIUM CHLORIDE: 600; 310; 30; 20 INJECTION, SOLUTION INTRAVENOUS at 06:08

## 2023-08-21 NOTE — ANESTHESIA PREPROCEDURE EVALUATION
08/21/2023  Zeferino Mclain Jr. is a 68 y.o., male.      Pre-op Assessment    I have reviewed the Patient Summary Reports.    I have reviewed the NPO Status.   I have reviewed the Medications.     Review of Systems  Anesthesia Hx:  No problems with previous Anesthesia  Neg history of prior surgery. Denies Family Hx of Anesthesia complications.  Personal Hx of Anesthesia complications   Social:  Former Smoker    Hematology/Oncology:  Hematology Normal      Current/Recent Cancer.   EENT/Dental:EENT/Dental Normal   Cardiovascular:  Cardiovascular Normal     Pulmonary:  Pulmonary Normal    Renal/:  Renal/ Normal     Hepatic/GI:  Hepatic/GI Normal    Musculoskeletal:  Musculoskeletal Normal    Neurological:  Neurology Normal    Endocrine:  Endocrine Normal    Dermatological:  Skin Normal    Psych:  Psychiatric Normal           Physical Exam  General: Cooperative and Alert    Airway:  Mallampati: I   Mouth Opening: Normal  TM Distance: Normal  Tongue: Normal  Neck ROM: Normal ROM    Dental:  Dentures        Anesthesia Plan  Type of Anesthesia, risks & benefits discussed:    Anesthesia Type: Gen Natural Airway  Intra-op Monitoring Plan: Standard ASA Monitors  Post Op Pain Control Plan: multimodal analgesia  Induction:  IV  Informed Consent: Informed consent signed with the Patient and all parties understand the risks and agree with anesthesia plan.  All questions answered. Patient consented to blood products? Yes  ASA Score: 2  Day of Surgery Review of History & Physical: I have interviewed and examined the patient. I have reviewed the patient's H&P dated: There are no significant changes.     Ready For Surgery From Anesthesia Perspective.     .

## 2023-08-21 NOTE — DISCHARGE INSTRUCTIONS
INSTRUCTIONS  AFTER A COLONOSCOPY/EGD                                                                                    NO DRIVING X 24 HOURS. NOTIFY YOUR DOCTOR WITH                                                                                 ABDOMINAL PAIN UNRELIEVED BY  PASSING GAS,                                                                           FEVER WITHIN 24 HOURS, OR LARGE AMOUNT OF BLEEDING.

## 2023-08-21 NOTE — ANESTHESIA POSTPROCEDURE EVALUATION
Anesthesia Post Evaluation    Patient: Zeferino Mclain Jr.    Procedure(s) Performed: Procedure(s) (LRB):  SIGMOIDOSCOPY, FLEXIBLE (N/A)    Final Anesthesia Type: general      Patient location during evaluation: OPS  Patient participation: Yes- Able to Participate  Level of consciousness: awake and alert  Post-procedure vital signs: reviewed and stable  Pain management: adequate  Airway patency: patent  GUILLERMO mitigation strategies: Multimodal analgesia  PONV status at discharge: No PONV  Anesthetic complications: no      Cardiovascular status: hemodynamically stable  Respiratory status: unassisted, spontaneous ventilation and room air  Hydration status: euvolemic  Follow-up not needed.          Vitals Value Taken Time   /72 08/21/23 0557   Temp 36.5 °C (97.7 °F) 08/21/23 0557   Pulse 54 08/21/23 0557   Resp 20 08/21/23 0557   SpO2 98 % 08/21/23 0557         No case tracking events are documented in the log.      Pain/Breanna Score: No data recorded

## 2023-08-21 NOTE — OP NOTE
[facility]                     [address] [address]    PATIENT NAME:      GIULIANA HENRIQUEZ   YOB: 1955  CSN:               [id number]  MRN:               [id number]  ADMIT DATE:        08/21/2023 06:05:00  PHYSICIAN:         Jennifer Tracy MD                          OPERATIVE REPORT      DATE OF SURGERY:    08/21/2023 00:00:00    SURGEON:  Jennifer Tracy MD    PREOPERATIVE DIAGNOSIS:  Moderately differentiated adenocarcinoma of the rectum,   grade 2.    POSTOPERATIVE DIAGNOSIS:  Moderately differentiated adenocarcinoma of the   rectum, grade 2.    PROCEDURE:  Flexible sigmoidoscopy.    ASSISTANT:  OR staff.    COMPLICATIONS:  None.    FINDINGS:  A 2.5 cm polypoid mass at the lowest rectal valve taking up between   1/3rd and 1/4th of the circumference.    DESCRIPTION OF PROCEDURE:  The patient was brought to the Endoscopy Suite,   placed in left lateral decubitus position.  Sedation was provided by IV.    Digital rectal exam was performed with findings of no mass lesions palpable and   no significant hemorrhoid disease identified.  The scope was advanced through   the anorectum to the sigmoid colon.  The scope was slowly retrieved.  The walls   were evaluated under distention.  At the lowest rectal valve, there was a   sessile polypoid mass present with associated tattoo injection consistent with   lesion at hand.  No biopsies were taken.  The scope was retrieved.  The patient   tolerated the procedure well.  There were no complications.        ______________________________  Jennifer Tracy MD    SS/BILL  DD:  08/21/2023  Time:  08:01AM  DT:  08/21/2023  Time:  08:12AM  Job #:  [id number]      OPERATIVE REPORT

## 2023-08-29 DIAGNOSIS — C20 RECTAL CANCER: Primary | ICD-10-CM

## 2023-08-29 LAB
ALBUMIN SERPL BCP-MCNC: 3.9 G/DL (ref 3.4–5)
ALBUMIN/GLOBULIN RATIO: 1.05 RATIO (ref 1.1–1.8)
ALP SERPL-CCNC: 116 U/L (ref 46–116)
ALT SERPL W P-5'-P-CCNC: 7 U/L (ref 12–78)
ANION GAP SERPL CALC-SCNC: 9 MMOL/L (ref 3–11)
AST SERPL-CCNC: 23 U/L (ref 15–37)
BASOPHILS NFR BLD: 1 % (ref 0–3)
BILIRUB SERPL-MCNC: 0.3 MG/DL (ref 0–1)
BUN SERPL-MCNC: 20 MG/DL (ref 7–18)
BUN/CREAT SERPL: 18.86 RATIO (ref 7–18)
CALCIUM SERPL-MCNC: 8.6 MG/DL (ref 8.8–10.5)
CHLORIDE SERPL-SCNC: 105 MMOL/L (ref 100–108)
CO2 SERPL-SCNC: 25 MMOL/L (ref 21–32)
CREAT SERPL-MCNC: 1.06 MG/DL (ref 0.7–1.3)
EOSINOPHIL NFR BLD: 2.7 % (ref 1–3)
ERYTHROCYTE [DISTWIDTH] IN BLOOD BY AUTOMATED COUNT: 14 % (ref 12.5–18)
GFR ESTIMATION: > 60
GLOBULIN: 3.7 G/DL (ref 2.3–3.5)
GLUCOSE SERPL-MCNC: 91 MG/DL (ref 70–110)
HCT VFR BLD AUTO: 36.8 % (ref 42–52)
HGB BLD-MCNC: 13 G/DL (ref 14–18)
LYMPHOCYTES NFR BLD: 21.9 % (ref 25–40)
MCH RBC QN AUTO: 27.4 PG (ref 27–31.2)
MCHC RBC AUTO-ENTMCNC: 35.3 G/DL (ref 31.8–35.4)
MCV RBC AUTO: 77.6 FL (ref 80–97)
MICROCYTES BLD QL SMEAR: NORMAL
MONOCYTES NFR BLD: 11.5 % (ref 1–15)
NEUTROPHILS # BLD AUTO: 4.2 10*3/UL (ref 1.8–7.7)
NEUTROPHILS NFR BLD: 62.6 % (ref 37–80)
NUCLEATED RED BLOOD CELLS: 0 %
PLATELETS: 171 10*3/UL (ref 142–424)
POTASSIUM SERPL-SCNC: 4.3 MMOL/L (ref 3.6–5.2)
PROT SERPL-MCNC: 7.6 G/DL (ref 6.4–8.2)
RBC # BLD AUTO: 4.74 10*6/UL (ref 4.7–6.1)
SODIUM BLD-SCNC: 139 MMOL/L (ref 135–145)
WBC # BLD: 6.7 10*3/UL (ref 4.6–10.2)

## 2023-08-31 LAB — CEA SERPL-MCNC: 0.7 NG/ML

## 2023-09-05 ENCOUNTER — OFFICE VISIT (OUTPATIENT)
Dept: HEMATOLOGY/ONCOLOGY | Facility: CLINIC | Age: 68
End: 2023-09-05
Payer: MEDICARE

## 2023-09-05 VITALS
DIASTOLIC BLOOD PRESSURE: 82 MMHG | OXYGEN SATURATION: 98 % | BODY MASS INDEX: 25.74 KG/M2 | RESPIRATION RATE: 18 BRPM | SYSTOLIC BLOOD PRESSURE: 163 MMHG | WEIGHT: 160.19 LBS | HEIGHT: 66 IN | HEART RATE: 55 BPM

## 2023-09-05 DIAGNOSIS — C20 RECTAL CANCER: Primary | ICD-10-CM

## 2023-09-05 PROCEDURE — 99214 PR OFFICE/OUTPT VISIT, EST, LEVL IV, 30-39 MIN: ICD-10-PCS | Mod: S$GLB,,, | Performed by: INTERNAL MEDICINE

## 2023-09-05 PROCEDURE — 99214 OFFICE O/P EST MOD 30 MIN: CPT | Mod: S$GLB,,, | Performed by: INTERNAL MEDICINE

## 2023-09-05 NOTE — PROGRESS NOTES
MEDICAL ONCOLOGY FOLLOW UP CONSULTATION NOTE    Patient ID: Zeferino Mclain Jr. is a 68 y.o. male.    Chief Complaint:  Rectal Cancer arising from a polyp    HPI:  Patient is a 68-year-old male former smoker with recent diagnosis of rectal cancer after he underwent screening colonoscopy which showed findings as below.  Patient presents to our clinic today for further evaluation voices no acute complaints.        Pathology:  2023     A. Pedunculated polyp at 25 cm:  Tubulovillous adenoma    B. Polyp at 2nd wall rectum:   Adenocarcinoma, moderately differentiated    Synoptic report:   Histologic Type adenocarcinoma  Histologic grade:  G2, moderately differentiated  Tumor size: 2 mm  Tumor extent:  Invades lamina propria  Lymphovascular invasion: Not identified  All margins negative for invasive carcinoma    Mismatch repair (MMR) IHC panel:  Mismatch repair (MMR) IHC panel:  MLH1, MSH2, MSH6 and PMS2 all show intact nuclear expression by immunohistochemistry.  There is no evidence of mismatch repair deficiency in this specimen      Imaging:     PET scan:  2023  Multiple abnormal lung nodules in the upper right paramidline chest medially and 1 in the lower lateral aspect of the right middle lung.  They all show abnormal tracer uptake.  Differential diagnosis may include immature granuloma but malignancy is not excluded  There are no abnormal areas of tracer uptake to suggest malignancy or metastatic disease      Past Medical History:   Diagnosis Date    Rectal cancer      Family History   Problem Relation Age of Onset    Dementia Mother     Lung cancer Father     Breast cancer Sister      Social History     Socioeconomic History    Marital status:    Tobacco Use    Smoking status: Former     Current packs/day: 0.00     Average packs/day: 0.5 packs/day for 30.0 years (15.0 ttl pk-yrs)     Types: Cigarettes     Start date: 1992     Quit date: 2022     Years since quittin.5     Passive  exposure: Past    Smokeless tobacco: Never   Substance and Sexual Activity    Alcohol use: Yes     Comment: SOCIAL    Drug use: Never    Sexual activity: Yes     Partners: Female     Past Surgical History:   Procedure Laterality Date    COLONOSCOPY      ESOPHAGOGASTRODUODENOSCOPY      FLEXIBLE SIGMOIDOSCOPY N/A 08/21/2023    Procedure: SIGMOIDOSCOPY, FLEXIBLE;  Surgeon: FLACO Champagne MD;  Location: [facility];  Service: Endoscopy;  Laterality: N/A;  Post op: rectal polyp w/rectal cancer    LUNG BIOPSY           Review of systems:  Review of Systems   Constitutional:  Negative for activity change, appetite change, chills, diaphoresis, fatigue and unexpected weight change.   HENT:  Negative for congestion, facial swelling, hearing loss, mouth sores, trouble swallowing and voice change.    Eyes:  Negative for photophobia, pain, discharge and itching.   Respiratory:  Negative for apnea, cough, choking, chest tightness and shortness of breath.    Cardiovascular:  Negative for chest pain, palpitations and leg swelling.   Gastrointestinal:  Negative for abdominal distention, abdominal pain, anal bleeding and blood in stool.   Endocrine: Negative for cold intolerance, heat intolerance, polydipsia and polyphagia.   Genitourinary:  Negative for difficulty urinating, dysuria, flank pain and hematuria.   Musculoskeletal:  Negative for arthralgias, back pain, joint swelling, myalgias, neck pain and neck stiffness.   Skin:  Negative for color change, pallor and wound.   Allergic/Immunologic: Negative for environmental allergies, food allergies and immunocompromised state.   Neurological:  Negative for dizziness, seizures, facial asymmetry, speech difficulty, light-headedness, numbness and headaches.   Hematological:  Negative for adenopathy. Does not bruise/bleed easily.   Psychiatric/Behavioral:  Negative for agitation, behavioral problems, confusion, decreased concentration and sleep disturbance.              Physical  Exam  Vitals and nursing note reviewed.   Constitutional:       General: He is not in acute distress.     Appearance: Normal appearance. He is not ill-appearing.   HENT:      Head: Normocephalic and atraumatic.      Nose: No congestion or rhinorrhea.   Eyes:      General: No scleral icterus.     Extraocular Movements: Extraocular movements intact.      Pupils: Pupils are equal, round, and reactive to light.   Cardiovascular:      Rate and Rhythm: Normal rate and regular rhythm.      Pulses: Normal pulses.      Heart sounds: Normal heart sounds. No murmur heard.     No gallop.   Pulmonary:      Effort: Pulmonary effort is normal. No respiratory distress.      Breath sounds: Normal breath sounds. No stridor. No wheezing or rhonchi.   Abdominal:      General: Bowel sounds are normal. There is no distension.      Palpations: There is no mass.      Tenderness: There is no abdominal tenderness. There is no guarding.   Musculoskeletal:         General: No swelling, tenderness, deformity or signs of injury. Normal range of motion.      Cervical back: Normal range of motion and neck supple. No rigidity. No muscular tenderness.      Right lower leg: No edema.      Left lower leg: No edema.   Skin:     General: Skin is warm.      Coloration: Skin is not jaundiced or pale.      Findings: No bruising or lesion.   Neurological:      General: No focal deficit present.      Mental Status: He is alert and oriented to person, place, and time.      Cranial Nerves: No cranial nerve deficit.      Sensory: No sensory deficit.      Motor: No weakness.      Gait: Gait normal.   Psychiatric:         Mood and Affect: Mood normal.         Behavior: Behavior normal.         Thought Content: Thought content normal.                                   Vitals:    09/05/23 0958   BP: (!) 163/82   Pulse: (!) 55   Resp: 18     Body surface area is 1.84 meters squared.    Assessment/Plan:      ECOG 1    Adenocarcinoma arising from rectal polyp:    ==  Tumor size 2 mm, Grade 2, KAMI- Stable.  No high-risk features and no lymphovascular invasion. pT1  == Discussed with patient discussed with patient in detail management options for pedunculated polyp with invasive cancer component and recommendation for observation if favorable histologic features and clear margins pT1 on NCCN guidelines.  == 9/5/23: Patient  discussed in multidisciplinary tumor board and the consensus decision to continue with observation with close surveillance as far as adenocarcinoma from the rectal polyp is concerned.  He needs to be evaluated for incidental pulmonary nodules likely secondary to inflammatory/infectious process noted on imaging and follows with pulmonary.    Plan:   Continue observation  Follow-up with Pulmonary    Return to clinic in 3 months for MD visit with labs:  CBC, CMP, CEA prior    Total time spent in counseling and discussion about further management options including relevant lab work, treatment,  prognosis, medications and intended side effects was more than 25 minutes. More than 50% of the time was spent on counseling and coordination of care.  I spent a total of 25 minutes on the day of the visit.This includes face to face time and non-face to face time preparing to see the patient (eg, review of tests), Obtaining and/or reviewing separately obtained history, Documenting clinical information in the electronic or other health record, Independently interpreting resultsand communicating results to the patient/family/caregiver, or Care coordination.

## 2023-09-06 ENCOUNTER — PATIENT MESSAGE (OUTPATIENT)
Dept: ADMINISTRATIVE | Facility: OTHER | Age: 68
End: 2023-09-06
Payer: OTHER GOVERNMENT

## 2023-09-07 ENCOUNTER — ANESTHESIA EVENT (OUTPATIENT)
Dept: SURGERY | Facility: HOSPITAL | Age: 68
End: 2023-09-07
Payer: OTHER GOVERNMENT

## 2023-09-08 ENCOUNTER — ANESTHESIA (OUTPATIENT)
Dept: SURGERY | Facility: HOSPITAL | Age: 68
End: 2023-09-08
Payer: OTHER GOVERNMENT

## 2023-09-08 ENCOUNTER — HOSPITAL ENCOUNTER (OUTPATIENT)
Facility: HOSPITAL | Age: 68
Discharge: HOME | End: 2023-09-08
Attending: SURGERY | Admitting: SURGERY
Payer: OTHER GOVERNMENT

## 2023-09-08 VITALS
BODY MASS INDEX: 25.84 KG/M2 | HEART RATE: 58 BPM | DIASTOLIC BLOOD PRESSURE: 79 MMHG | SYSTOLIC BLOOD PRESSURE: 149 MMHG | OXYGEN SATURATION: 100 % | TEMPERATURE: 98 F | WEIGHT: 160 LBS | RESPIRATION RATE: 18 BRPM

## 2023-09-08 DIAGNOSIS — C20 RECTAL CANCER: Primary | ICD-10-CM

## 2023-09-08 PROCEDURE — 27201423 OPTIME MED/SURG SUP & DEVICES STERILE SUPPLY: Performed by: SURGERY

## 2023-09-08 PROCEDURE — 71000016 HC POSTOP RECOV ADDL HR: Performed by: SURGERY

## 2023-09-08 PROCEDURE — 71000015 HC POSTOP RECOV 1ST HR: Performed by: SURGERY

## 2023-09-08 PROCEDURE — 37000009 HC ANESTHESIA EA ADD 15 MINS: Performed by: SURGERY

## 2023-09-08 PROCEDURE — 25000003 PHARM REV CODE 250: Performed by: NURSE ANESTHETIST, CERTIFIED REGISTERED

## 2023-09-08 PROCEDURE — 63600175 PHARM REV CODE 636 W HCPCS: Performed by: ANESTHESIOLOGY

## 2023-09-08 PROCEDURE — D9220A PRA ANESTHESIA: ICD-10-PCS | Mod: ,,, | Performed by: NURSE ANESTHETIST, CERTIFIED REGISTERED

## 2023-09-08 PROCEDURE — 71000033 HC RECOVERY, INTIAL HOUR: Performed by: SURGERY

## 2023-09-08 PROCEDURE — 63600175 PHARM REV CODE 636 W HCPCS: Performed by: NURSE ANESTHETIST, CERTIFIED REGISTERED

## 2023-09-08 PROCEDURE — 63600175 PHARM REV CODE 636 W HCPCS: Performed by: SURGERY

## 2023-09-08 PROCEDURE — D9220A PRA ANESTHESIA: Mod: ,,, | Performed by: NURSE ANESTHETIST, CERTIFIED REGISTERED

## 2023-09-08 PROCEDURE — 36000711: Performed by: SURGERY

## 2023-09-08 PROCEDURE — 36000710: Performed by: SURGERY

## 2023-09-08 PROCEDURE — 37000008 HC ANESTHESIA 1ST 15 MINUTES: Performed by: SURGERY

## 2023-09-08 RX ORDER — SODIUM CHLORIDE 0.9 % (FLUSH) 0.9 %
10 SYRINGE (ML) INJECTION
Status: DISCONTINUED | OUTPATIENT
Start: 2023-09-08 | End: 2023-09-08 | Stop reason: HOSPADM

## 2023-09-08 RX ORDER — LIDOCAINE HYDROCHLORIDE 20 MG/ML
INJECTION, SOLUTION EPIDURAL; INFILTRATION; INTRACAUDAL; PERINEURAL
Status: DISCONTINUED | OUTPATIENT
Start: 2023-09-08 | End: 2023-09-08

## 2023-09-08 RX ORDER — ROCURONIUM BROMIDE 10 MG/ML
INJECTION, SOLUTION INTRAVENOUS
Status: DISCONTINUED | OUTPATIENT
Start: 2023-09-08 | End: 2023-09-08

## 2023-09-08 RX ORDER — ONDANSETRON 2 MG/ML
INJECTION INTRAMUSCULAR; INTRAVENOUS
Status: DISCONTINUED | OUTPATIENT
Start: 2023-09-08 | End: 2023-09-08

## 2023-09-08 RX ORDER — FENTANYL CITRATE 50 UG/ML
25 INJECTION, SOLUTION INTRAMUSCULAR; INTRAVENOUS EVERY 5 MIN PRN
Status: DISCONTINUED | OUTPATIENT
Start: 2023-09-08 | End: 2023-09-08 | Stop reason: HOSPADM

## 2023-09-08 RX ORDER — OXYCODONE HYDROCHLORIDE 5 MG/1
5 TABLET ORAL EVERY 4 HOURS PRN
Qty: 10 TABLET | Refills: 0 | Status: SHIPPED | OUTPATIENT
Start: 2023-09-08

## 2023-09-08 RX ORDER — METRONIDAZOLE 500 MG/1
500 TABLET ORAL EVERY 12 HOURS
Qty: 8 TABLET | Refills: 0 | Status: SHIPPED | OUTPATIENT
Start: 2023-09-08 | End: 2023-09-12

## 2023-09-08 RX ORDER — HYDROMORPHONE HYDROCHLORIDE 2 MG/ML
0.2 INJECTION, SOLUTION INTRAMUSCULAR; INTRAVENOUS; SUBCUTANEOUS EVERY 5 MIN PRN
Status: DISCONTINUED | OUTPATIENT
Start: 2023-09-08 | End: 2023-09-08 | Stop reason: HOSPADM

## 2023-09-08 RX ORDER — CLINDAMYCIN PHOSPHATE 600 MG/50ML
600 INJECTION, SOLUTION INTRAVENOUS
Status: COMPLETED | OUTPATIENT
Start: 2023-09-08 | End: 2023-09-08

## 2023-09-08 RX ORDER — PROPOFOL 10 MG/ML
VIAL (ML) INTRAVENOUS
Status: DISCONTINUED | OUTPATIENT
Start: 2023-09-08 | End: 2023-09-08

## 2023-09-08 RX ORDER — DEXAMETHASONE SODIUM PHOSPHATE 4 MG/ML
INJECTION, SOLUTION INTRA-ARTICULAR; INTRALESIONAL; INTRAMUSCULAR; INTRAVENOUS; SOFT TISSUE
Status: DISCONTINUED | OUTPATIENT
Start: 2023-09-08 | End: 2023-09-08

## 2023-09-08 RX ORDER — FENTANYL CITRATE 50 UG/ML
INJECTION, SOLUTION INTRAMUSCULAR; INTRAVENOUS
Status: DISCONTINUED | OUTPATIENT
Start: 2023-09-08 | End: 2023-09-08

## 2023-09-08 RX ORDER — CIPROFLOXACIN 500 MG/1
500 TABLET ORAL 2 TIMES DAILY
Qty: 8 TABLET | Refills: 0 | Status: SHIPPED | OUTPATIENT
Start: 2023-09-08 | End: 2023-09-12

## 2023-09-08 RX ORDER — SODIUM CHLORIDE, SODIUM LACTATE, POTASSIUM CHLORIDE, CALCIUM CHLORIDE 600; 310; 30; 20 MG/100ML; MG/100ML; MG/100ML; MG/100ML
INJECTION, SOLUTION INTRAVENOUS CONTINUOUS
Status: DISCONTINUED | OUTPATIENT
Start: 2023-09-08 | End: 2023-09-08 | Stop reason: HOSPADM

## 2023-09-08 RX ORDER — BUPIVACAINE HYDROCHLORIDE 5 MG/ML
INJECTION, SOLUTION EPIDURAL; INTRACAUDAL
Status: DISCONTINUED | OUTPATIENT
Start: 2023-09-08 | End: 2023-09-08 | Stop reason: HOSPADM

## 2023-09-08 RX ADMIN — DEXAMETHASONE SODIUM PHOSPHATE 4 MG: 4 INJECTION, SOLUTION INTRA-ARTICULAR; INTRALESIONAL; INTRAMUSCULAR; INTRAVENOUS; SOFT TISSUE at 09:09

## 2023-09-08 RX ADMIN — SUGAMMADEX 200 MG: 100 INJECTION, SOLUTION INTRAVENOUS at 12:09

## 2023-09-08 RX ADMIN — CLINDAMYCIN PHOSPHATE 600 MG: 600 INJECTION, SOLUTION INTRAVENOUS at 09:09

## 2023-09-08 RX ADMIN — ROCURONIUM BROMIDE 30 MG: 50 INJECTION INTRAVENOUS at 09:09

## 2023-09-08 RX ADMIN — SODIUM CHLORIDE, POTASSIUM CHLORIDE, SODIUM LACTATE AND CALCIUM CHLORIDE: 600; 310; 30; 20 INJECTION, SOLUTION INTRAVENOUS at 06:09

## 2023-09-08 RX ADMIN — PROPOFOL 100 MG: 10 INJECTION, EMULSION INTRAVENOUS at 09:09

## 2023-09-08 RX ADMIN — PROPOFOL 150 MG: 10 INJECTION, EMULSION INTRAVENOUS at 09:09

## 2023-09-08 RX ADMIN — ROCURONIUM BROMIDE 20 MG: 50 INJECTION INTRAVENOUS at 10:09

## 2023-09-08 RX ADMIN — FENTANYL CITRATE 100 MCG: 50 INJECTION, SOLUTION INTRAMUSCULAR; INTRAVENOUS at 09:09

## 2023-09-08 RX ADMIN — HYDROMORPHONE HYDROCHLORIDE 0.2 MG: 2 INJECTION INTRAMUSCULAR; INTRAVENOUS; SUBCUTANEOUS at 12:09

## 2023-09-08 RX ADMIN — PROPOFOL 50 MG: 10 INJECTION, EMULSION INTRAVENOUS at 09:09

## 2023-09-08 RX ADMIN — ROCURONIUM BROMIDE 20 MG: 50 INJECTION INTRAVENOUS at 11:09

## 2023-09-08 RX ADMIN — ONDANSETRON 4 MG: 2 INJECTION INTRAMUSCULAR; INTRAVENOUS at 09:09

## 2023-09-08 RX ADMIN — LIDOCAINE HYDROCHLORIDE 60 MG: 20 INJECTION, SOLUTION EPIDURAL; INFILTRATION; INTRACAUDAL; PERINEURAL at 09:09

## 2023-09-08 NOTE — TRANSFER OF CARE
Anesthesia Transfer of Care Note    Patient: Zeferino Mclain Jr.    Procedure(s) Performed: Procedure(s) (LRB):  ROBOTIC TAMIS (TRANSANAL MINIMALLY INVASIVE SURGERY) (N/A)  SIGMOIDOSCOPY, FLEXIBLE    Patient location: PACU    Anesthesia Type: general    Transport from OR: Transported from OR on room air with adequate spontaneous ventilation    Post pain: adequate analgesia    Post assessment: no apparent anesthetic complications    Post vital signs: stable    Level of consciousness: awake    Nausea/Vomiting: no nausea/vomiting    Complications: none    Transfer of care protocol was followed      Last vitals:   Visit Vitals  BP (!) 171/87   Pulse (!) 52   Temp 36.3 °C (97.4 °F) (Oral)   Wt 72.6 kg (160 lb)   SpO2 98%   BMI 25.84 kg/m²

## 2023-09-08 NOTE — ANESTHESIA POSTPROCEDURE EVALUATION
Anesthesia Post Evaluation    Patient: Zeferino Mclain Jr.    Procedure(s) Performed: Procedure(s) (LRB):  ROBOTIC TAMIS (TRANSANAL MINIMALLY INVASIVE SURGERY) (N/A)  SIGMOIDOSCOPY, FLEXIBLE    Final Anesthesia Type: general      Patient location during evaluation: PACU  Patient participation: Yes- Able to Participate  Level of consciousness: awake and alert and oriented  Post-procedure vital signs: reviewed and stable  Pain management: adequate  Airway patency: patent    PONV status at discharge: No PONV  Anesthetic complications: no      Cardiovascular status: stable  Respiratory status: unassisted, spontaneous ventilation and room air  Hydration status: euvolemic  Follow-up not needed.          Vitals Value Taken Time     09/08/23 1230     09/08/23 1230   Pulse 70 09/08/23 1229   Resp 15 09/08/23 1229   SpO2 98 % 09/08/23 1229   Vitals shown include unvalidated device data.      No case tracking events are documented in the log.      Pain/Breanna Score: No data recorded

## 2023-09-12 LAB — PSYCHE PATHOLOGY RESULT: NORMAL

## 2023-09-18 LAB — ACID FAST BACILLI STAIN: NORMAL

## 2023-10-09 NOTE — OP NOTE
Date of procedure:  09/08/2023    Preoperative Diagnosis:  Adenocarcinoma of rectum within tubulovillous adenoma    Postoperative Diagnosis:  Adenocarcinoma of rectum within tubulovillous adenoma    Procedure:  Flexible sigmoidoscopy  Robotic transanal minimally invasive surgery for resection of polyp    Surgeon:  Jennifer Jean MD    Assistant:   OR staff    Anesthesia:  GETA    Findings:  2x3 cm mass of rectum, removed full thickness with 1 cm margins circumferentially.  Closed defect without issue.      Specimens:  Rectal mass    Complications:  None    Operative report:  Patient was brought to the OR, placed in left lateral decubitus position.  IV sedation was provided.  Timeout was carried out and agreed upon.  Flexible sigmoidoscopy was performed to confirm appropriate orientation of lesion for planned position.  Patient was then positioned on the OR table, and GETA was administered.  His legs were placed in stirrups.  TAMIS bullet probe was inserted and secured.  Insufflation of the rectum was achieved.  Robot was docked, and robotic instruments were inserted.  The lesion was identified.  Marking of one cm margin around the lesion was carried out with cautery.  Hot scissors were used for dissection of this lesion including full thickness of rectal wall.  Vessel sealer was used, as well.  The lesion was completely removed in appropriate orientation for marking, and marking sutures were placed.  Inspection of the specimen revealed lesion fully intact with 1 cm margins circumferentially.  Next, attention was addressed toward closure of the defect.  This was carried out with V loc suture in running manner.  The patient tolerated the procedure well without complication.

## 2023-10-26 ENCOUNTER — OFFICE VISIT (OUTPATIENT)
Dept: INFECTIOUS DISEASES | Facility: CLINIC | Age: 68
End: 2023-10-26
Payer: MEDICARE

## 2023-10-26 VITALS — DIASTOLIC BLOOD PRESSURE: 73 MMHG | SYSTOLIC BLOOD PRESSURE: 134 MMHG

## 2023-10-26 DIAGNOSIS — R91.8 LUNG NODULE, MULTIPLE: ICD-10-CM

## 2023-10-26 DIAGNOSIS — A31.0 MYCOBACTERIUM AVIUM COMPLEX: ICD-10-CM

## 2023-10-26 PROCEDURE — 1159F MED LIST DOCD IN RCRD: CPT | Mod: CPTII,S$GLB,, | Performed by: INTERNAL MEDICINE

## 2023-10-26 PROCEDURE — 3078F DIAST BP <80 MM HG: CPT | Mod: CPTII,S$GLB,, | Performed by: INTERNAL MEDICINE

## 2023-10-26 PROCEDURE — 3078F PR MOST RECENT DIASTOLIC BLOOD PRESSURE < 80 MM HG: ICD-10-PCS | Mod: CPTII,S$GLB,, | Performed by: INTERNAL MEDICINE

## 2023-10-26 PROCEDURE — 99203 PR OFFICE/OUTPT VISIT, NEW, LEVL III, 30-44 MIN: ICD-10-PCS | Mod: S$GLB,,, | Performed by: INTERNAL MEDICINE

## 2023-10-26 PROCEDURE — 1160F RVW MEDS BY RX/DR IN RCRD: CPT | Mod: CPTII,S$GLB,, | Performed by: INTERNAL MEDICINE

## 2023-10-26 PROCEDURE — 99203 OFFICE O/P NEW LOW 30 MIN: CPT | Mod: S$GLB,,, | Performed by: INTERNAL MEDICINE

## 2023-10-26 PROCEDURE — 1159F PR MEDICATION LIST DOCUMENTED IN MEDICAL RECORD: ICD-10-PCS | Mod: CPTII,S$GLB,, | Performed by: INTERNAL MEDICINE

## 2023-10-26 PROCEDURE — 3075F SYST BP GE 130 - 139MM HG: CPT | Mod: CPTII,S$GLB,, | Performed by: INTERNAL MEDICINE

## 2023-10-26 PROCEDURE — 3075F PR MOST RECENT SYSTOLIC BLOOD PRESS GE 130-139MM HG: ICD-10-PCS | Mod: CPTII,S$GLB,, | Performed by: INTERNAL MEDICINE

## 2023-10-26 PROCEDURE — 1160F PR REVIEW ALL MEDS BY PRESCRIBER/CLIN PHARMACIST DOCUMENTED: ICD-10-PCS | Mod: CPTII,S$GLB,, | Performed by: INTERNAL MEDICINE

## 2023-10-26 NOTE — PROGRESS NOTES
Subjective:       Patient ID: Zeferino Mclain Jr. is a 68 y.o. male.    Chief Complaint: Referral    Patient referred for MAC found on lung biopsy.  He reports that he saw a PCP about a year ago and was referred for a low dose screening CT given his tobacco history.  He was found to have some lung nodules and was seen by Dr. Bernardo.  These were biopsied and did not show evidence of malignancy.  Samples did grow Pseudomonas and one sample grew MAC.  He reports he was treated with levofloxacin and his symptoms of mild cough resolved.  He has not had any recurrence since that time.  He reports he exercises daily for 1 hour without shortness of breath.  No fever or chills.  No other complaints at this time.      Discussed pathology and typical presentation of MAC and that he does fit the profile.  Given amount of nodular disease treatment would be reasonable.  He is reluctant to take medication at this time.    He recently underwent surgery for rectal cancer and is recovering well.      No other issues at this time.  Review of Systems   Constitutional:  Negative for chills, fatigue and fever.   HENT:  Negative for congestion, ear pain, sinus pain and sore throat.    Respiratory:  Negative for cough and shortness of breath.    Cardiovascular:  Negative for chest pain and palpitations.   Gastrointestinal:  Negative for abdominal pain, diarrhea, nausea and vomiting.   Endocrine: Negative for cold intolerance and heat intolerance.   Genitourinary:  Negative for dysuria and hematuria.   Musculoskeletal:  Negative for back pain, joint swelling and myalgias.   Skin:  Negative for rash and wound.   Neurological:  Negative for weakness, numbness and headaches.   Psychiatric/Behavioral:  Negative for confusion and sleep disturbance.      Objective:      Physical Exam  Vitals and nursing note reviewed.   Constitutional:       Appearance: Normal appearance. He is well-developed.   HENT:      Head: Normocephalic and atraumatic.       Right Ear: External ear normal.      Left Ear: External ear normal.      Mouth/Throat:      Pharynx: Oropharynx is clear. No oropharyngeal exudate.   Eyes:      General: No scleral icterus.     Conjunctiva/sclera: Conjunctivae normal.      Pupils: Pupils are equal, round, and reactive to light.   Neck:      Vascular: No JVD.      Trachea: No tracheal deviation.   Cardiovascular:      Rate and Rhythm: Normal rate and regular rhythm.      Heart sounds: Normal heart sounds. No murmur heard.  Pulmonary:      Effort: Pulmonary effort is normal. No respiratory distress.      Breath sounds: Normal breath sounds. No wheezing.   Abdominal:      General: Bowel sounds are normal. There is no distension.      Palpations: Abdomen is soft.      Tenderness: There is no abdominal tenderness. There is no guarding.   Musculoskeletal:         General: No tenderness. Normal range of motion.      Cervical back: Neck supple. No rigidity.   Skin:     General: Skin is warm and dry.      Findings: No erythema or rash.   Neurological:      Mental Status: He is alert and oriented to person, place, and time. Mental status is at baseline.   Psychiatric:         Mood and Affect: Mood normal.         Behavior: Behavior normal.       Assessment:       1. Mycobacterium avium complex    2. Lung nodule, multiple        Plan:       Problem List Items Addressed This Visit          Pulmonary    Lung nodule, multiple     S/p bronchoscopy, no evidence of malignancy.  ? If MAC is active disease versus colonization.  Given that patient is asymptomatic would be reasonable to observe for now.            ID    Mycobacterium avium complex     Infection versus colonization.  Patient asymptomatic, would like to avoid treatment at this time.  Will monitor and if symptoms develop reconsider.

## 2023-10-26 NOTE — ASSESSMENT & PLAN NOTE
Infection versus colonization.  Patient asymptomatic, would like to avoid treatment at this time.  Will monitor and if symptoms develop reconsider.

## 2023-10-26 NOTE — ASSESSMENT & PLAN NOTE
S/p bronchoscopy, no evidence of malignancy.  ? If MAC is active disease versus colonization.  Given that patient is asymptomatic would be reasonable to observe for now.

## 2023-12-01 ENCOUNTER — TELEPHONE (OUTPATIENT)
Dept: HEMATOLOGY/ONCOLOGY | Facility: CLINIC | Age: 68
End: 2023-12-01
Payer: OTHER GOVERNMENT

## 2023-12-01 NOTE — TELEPHONE ENCOUNTER
Spoke w/ patient. We have an authorization from [facility] for txt and visits with our office. [facility] should cover appts and testing

## 2023-12-04 DIAGNOSIS — C20 RECTAL CANCER: Primary | ICD-10-CM

## 2023-12-05 ENCOUNTER — OFFICE VISIT (OUTPATIENT)
Dept: HEMATOLOGY/ONCOLOGY | Facility: CLINIC | Age: 68
End: 2023-12-05
Payer: OTHER GOVERNMENT

## 2023-12-05 VITALS
BODY MASS INDEX: 25.71 KG/M2 | WEIGHT: 160 LBS | SYSTOLIC BLOOD PRESSURE: 164 MMHG | RESPIRATION RATE: 18 BRPM | DIASTOLIC BLOOD PRESSURE: 87 MMHG | HEIGHT: 66 IN | HEART RATE: 53 BPM | OXYGEN SATURATION: 98 %

## 2023-12-05 DIAGNOSIS — C20 RECTAL CANCER: Primary | ICD-10-CM

## 2023-12-05 LAB
ALBUMIN SERPL BCP-MCNC: 3.7 G/DL (ref 3.4–5)
ALBUMIN/GLOBULIN RATIO: 0.84 RATIO (ref 1.1–1.8)
ALP SERPL-CCNC: 121 U/L (ref 46–116)
ALT SERPL W P-5'-P-CCNC: 20 U/L (ref 12–78)
ANION GAP SERPL CALC-SCNC: 6 MMOL/L (ref 3–11)
AST SERPL-CCNC: 24 U/L (ref 15–37)
BASOPHILS NFR BLD: 0.9 % (ref 0–3)
BILIRUB SERPL-MCNC: 0.7 MG/DL (ref 0–1)
BUN SERPL-MCNC: 19 MG/DL (ref 7–18)
BUN/CREAT SERPL: 16.81 RATIO (ref 7–18)
CALCIUM SERPL-MCNC: 8.9 MG/DL (ref 8.8–10.5)
CHLORIDE SERPL-SCNC: 102 MMOL/L (ref 100–108)
CO2 SERPL-SCNC: 30 MMOL/L (ref 21–32)
CREAT SERPL-MCNC: 1.13 MG/DL (ref 0.7–1.3)
EOSINOPHIL NFR BLD: 3.2 % (ref 1–3)
ERYTHROCYTE [DISTWIDTH] IN BLOOD BY AUTOMATED COUNT: 14.4 % (ref 12.5–18)
GFR ESTIMATION: > 60
GLOBULIN: 4.4 G/DL (ref 2.3–3.5)
GLUCOSE SERPL-MCNC: 115 MG/DL (ref 70–110)
HCT VFR BLD AUTO: 38.2 % (ref 42–52)
HGB BLD-MCNC: 13.1 G/DL (ref 14–18)
LYMPHOCYTES NFR BLD: 23.5 % (ref 25–40)
MCH RBC QN AUTO: 27.2 PG (ref 27–31.2)
MCHC RBC AUTO-ENTMCNC: 34.3 G/DL (ref 31.8–35.4)
MCV RBC AUTO: 79.3 FL (ref 80–97)
MONOCYTES NFR BLD: 11.8 % (ref 1–15)
NEUTROPHILS # BLD AUTO: 3.93 10*3/UL (ref 1.8–7.7)
NEUTROPHILS NFR BLD: 60.3 % (ref 37–80)
NUCLEATED RED BLOOD CELLS: 0 %
PLATELETS: 172 10*3/UL (ref 142–424)
POTASSIUM SERPL-SCNC: 4.7 MMOL/L (ref 3.6–5.2)
PROT SERPL-MCNC: 8.1 G/DL (ref 6.4–8.2)
RBC # BLD AUTO: 4.82 10*6/UL (ref 4.7–6.1)
SODIUM BLD-SCNC: 138 MMOL/L (ref 135–145)
WBC # BLD: 6.5 10*3/UL (ref 4.6–10.2)

## 2023-12-05 PROCEDURE — 99214 PR OFFICE/OUTPT VISIT, EST, LEVL IV, 30-39 MIN: ICD-10-PCS | Mod: S$GLB,,, | Performed by: NURSE PRACTITIONER

## 2023-12-05 PROCEDURE — 99214 OFFICE O/P EST MOD 30 MIN: CPT | Mod: S$GLB,,, | Performed by: NURSE PRACTITIONER

## 2023-12-05 NOTE — PROGRESS NOTES
MEDICAL ONCOLOGY FOLLOW UP CONSULTATION NOTE    Patient ID: Zeferino Mclain Jr. is a 68 y.o. male.    Chief Complaint:  Rectal Cancer arising from a polyp    HPI:  Patient is a 68-year-old male former smoker with recent diagnosis of rectal cancer after he underwent screening colonoscopy which showed findings as below.  Patient presents to our clinic today for further evaluation voices no acute complaints.        Pathology:  2023     A. Pedunculated polyp at 25 cm:  Tubulovillous adenoma    B. Polyp at 2nd wall rectum:   Adenocarcinoma, moderately differentiated    Synoptic report:   Histologic Type adenocarcinoma  Histologic grade:  G2, moderately differentiated  Tumor size: 2 mm  Tumor extent:  Invades lamina propria  Lymphovascular invasion: Not identified  All margins negative for invasive carcinoma    Mismatch repair (MMR) IHC panel:  Mismatch repair (MMR) IHC panel:  MLH1, MSH2, MSH6 and PMS2 all show intact nuclear expression by immunohistochemistry.  There is no evidence of mismatch repair deficiency in this specimen      Imaging:     PET scan:  2023  Multiple abnormal lung nodules in the upper right paramidline chest medially and 1 in the lower lateral aspect of the right middle lung.  They all show abnormal tracer uptake.  Differential diagnosis may include immature granuloma but malignancy is not excluded  There are no abnormal areas of tracer uptake to suggest malignancy or metastatic disease      Past Medical History:   Diagnosis Date    Rectal cancer      Family History   Problem Relation Age of Onset    Dementia Mother     Lung cancer Father     Breast cancer Sister      Social History     Socioeconomic History    Marital status:    Tobacco Use    Smoking status: Former     Current packs/day: 0.00     Average packs/day: 0.5 packs/day for 30.0 years (15.0 ttl pk-yrs)     Types: Cigarettes     Start date: 1992     Quit date: 2022     Years since quittin.8     Passive  exposure: Past    Smokeless tobacco: Never   Substance and Sexual Activity    Alcohol use: Yes     Comment: SOCIAL    Drug use: Never    Sexual activity: Yes     Partners: Female     Past Surgical History:   Procedure Laterality Date    COLONOSCOPY      ESOPHAGOGASTRODUODENOSCOPY      FLEXIBLE SIGMOIDOSCOPY N/A 08/21/2023    Procedure: SIGMOIDOSCOPY, FLEXIBLE;  Surgeon: FLACO Champagne MD;  Location: [facility];  Service: Endoscopy;  Laterality: N/A;  Post op: rectal polyp w/rectal cancer    FLEXIBLE SIGMOIDOSCOPY  9/8/2023    Procedure: SIGMOIDOSCOPY, FLEXIBLE;  Surgeon: FLACO Champagne MD;  Location: [facility];  Service: General;;    LUNG BIOPSY      ROBOTIC TAMIS (TRANSANAL MINIMALLY INVASIVE SURGERY) N/A 9/8/2023    Procedure: ROBOTIC TAMIS (TRANSANAL MINIMALLY INVASIVE SURGERY);  Surgeon: FLACO Champagne MD;  Location: [facility];  Service: General;  Laterality: N/A;         Review of systems:  Review of Systems   Constitutional:  Negative for activity change, appetite change, chills, diaphoresis, fatigue and unexpected weight change.   HENT:  Negative for congestion, facial swelling, hearing loss, mouth sores, trouble swallowing and voice change.    Eyes:  Negative for photophobia, pain, discharge and itching.   Respiratory:  Negative for apnea, cough, choking, chest tightness and shortness of breath.    Cardiovascular:  Negative for chest pain, palpitations and leg swelling.   Gastrointestinal:  Negative for abdominal distention, abdominal pain, anal bleeding and blood in stool.   Endocrine: Negative for cold intolerance, heat intolerance, polydipsia and polyphagia.   Genitourinary:  Negative for difficulty urinating, dysuria, flank pain and hematuria.   Musculoskeletal:  Negative for arthralgias, back pain, joint swelling, myalgias, neck pain and neck stiffness.   Skin:  Negative for color change, pallor and wound.   Allergic/Immunologic: Negative for environmental allergies, food  allergies and immunocompromised state.   Neurological:  Negative for dizziness, seizures, facial asymmetry, speech difficulty, light-headedness, numbness and headaches.   Hematological:  Negative for adenopathy. Does not bruise/bleed easily.   Psychiatric/Behavioral:  Negative for agitation, behavioral problems, confusion, decreased concentration and sleep disturbance.              Physical Exam  Vitals and nursing note reviewed.   Constitutional:       General: He is not in acute distress.     Appearance: Normal appearance. He is not ill-appearing.   HENT:      Head: Normocephalic and atraumatic.      Nose: No congestion or rhinorrhea.   Eyes:      General: No scleral icterus.     Extraocular Movements: Extraocular movements intact.      Pupils: Pupils are equal, round, and reactive to light.   Cardiovascular:      Rate and Rhythm: Normal rate and regular rhythm.      Pulses: Normal pulses.      Heart sounds: Normal heart sounds. No murmur heard.     No gallop.   Pulmonary:      Effort: Pulmonary effort is normal. No respiratory distress.      Breath sounds: Normal breath sounds. No stridor. No wheezing or rhonchi.   Abdominal:      General: Bowel sounds are normal. There is no distension.      Palpations: There is no mass.      Tenderness: There is no abdominal tenderness. There is no guarding.   Musculoskeletal:         General: No swelling, tenderness, deformity or signs of injury. Normal range of motion.      Cervical back: Normal range of motion and neck supple. No rigidity. No muscular tenderness.      Right lower leg: No edema.      Left lower leg: No edema.   Skin:     General: Skin is warm.      Coloration: Skin is not jaundiced or pale.      Findings: No bruising or lesion.   Neurological:      General: No focal deficit present.      Mental Status: He is alert and oriented to person, place, and time.      Cranial Nerves: No cranial nerve deficit.      Sensory: No sensory deficit.      Motor: No weakness.       Gait: Gait normal.   Psychiatric:         Mood and Affect: Mood normal.         Behavior: Behavior normal.         Thought Content: Thought content normal.                                   Vitals:    12/05/23 0923   BP: (!) 164/87   Pulse: (!) 53   Resp: 18     Body surface area is 1.84 meters squared.    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  No high-risk features and no lymphovascular invasion. pT1  == Discussed with patient discussed with patient in detail management options for pedunculated polyp with invasive cancer component and recommendation for observation if favorable histologic features and clear margins pT1 on NCCN guidelines.  == 9/5/23: Patient  discussed in multidisciplinary tumor board and the consensus decision to continue with observation with close surveillance as far as adenocarcinoma from the rectal polyp is concerned.  He needs to be evaluated for incidental pulmonary nodules likely secondary to inflammatory/infectious process noted on imaging and follows with pulmonary.  == 12/5/23: doing well with no new c/o. Has upcoming appointment with Dr Tracy next week for routine f/u. He denies any melena, abdominal pain or changes in bowel habits. Labs reviewed anemia improving No CEA available today      2. Lung nodule, multiple  - S/p bronchoscopy, no evidence of malignancy.  ? If MAC is active disease versus colonization.  Given that patient is asymptomatic would be reasonable to observe for now per Dr Trevizo's recommendation  - quit smoking 2 years ago     Plan:   Continue observation  Recommended repeat c-scope by June 2024   Return to clinic in 3 months for NP in [facility] prior at [facility]:  CBC, CMP, CEA     Total time spent in counseling and discussion about further management options including relevant lab work, treatment,  prognosis, medications and intended side effects was more than 25  minutes. More than 50% of the time was spent on counseling and coordination of care.  I spent a total of 25 minutes on the day of the visit.This includes face to face time and non-face to face time preparing to see the patient (eg, review of tests), Obtaining and/or reviewing separately obtained history, Documenting clinical information in the electronic or other health record, Independently interpreting resultsand communicating results to the patient/family/caregiver, or Care coordination.

## 2023-12-07 ENCOUNTER — HOSPITAL ENCOUNTER (OUTPATIENT)
Dept: RADIOLOGY | Facility: HOSPITAL | Age: 68
Discharge: HOME | End: 2023-12-07
Attending: SURGERY
Payer: OTHER GOVERNMENT

## 2023-12-07 DIAGNOSIS — C20 MALIGNANT NEOPLASM OF RECTUM: ICD-10-CM

## 2023-12-07 LAB — CEA SERPL-MCNC: 1 NG/ML

## 2023-12-07 PROCEDURE — 25500020 PHARM REV CODE 255: Performed by: SURGERY

## 2023-12-07 PROCEDURE — 72197 MRI PELVIS W/O & W/DYE: CPT | Mod: TC

## 2023-12-07 PROCEDURE — A9577 INJ MULTIHANCE: HCPCS | Performed by: SURGERY

## 2023-12-07 RX ADMIN — GADOBENATE DIMEGLUMINE 15 ML: 529 INJECTION, SOLUTION INTRAVENOUS at 07:12

## 2023-12-08 DIAGNOSIS — C20 MALIGNANT NEOPLASM OF RECTUM: Primary | ICD-10-CM

## 2024-03-19 ENCOUNTER — OFFICE VISIT (OUTPATIENT)
Dept: HEMATOLOGY/ONCOLOGY | Facility: CLINIC | Age: 69
End: 2024-03-19
Payer: OTHER GOVERNMENT

## 2024-03-19 VITALS
DIASTOLIC BLOOD PRESSURE: 83 MMHG | OXYGEN SATURATION: 98 % | HEART RATE: 46 BPM | WEIGHT: 160 LBS | RESPIRATION RATE: 17 BRPM | SYSTOLIC BLOOD PRESSURE: 183 MMHG | BODY MASS INDEX: 25.71 KG/M2 | HEIGHT: 66 IN

## 2024-03-19 DIAGNOSIS — C20 RECTAL CANCER: Primary | ICD-10-CM

## 2024-03-19 LAB
ALBUMIN SERPL BCP-MCNC: 3.4 G/DL (ref 3.4–5)
ALBUMIN/GLOBULIN RATIO: 0.74 RATIO (ref 1.1–1.8)
ALP SERPL-CCNC: 115 U/L (ref 46–116)
ALT SERPL W P-5'-P-CCNC: 17 U/L (ref 12–78)
ANION GAP SERPL CALC-SCNC: 5 MMOL/L (ref 3–11)
AST SERPL-CCNC: 11 U/L (ref 15–37)
BASOPHILS NFR BLD: 1 % (ref 0–3)
BILIRUB SERPL-MCNC: 0.4 MG/DL (ref 0–1)
BUN SERPL-MCNC: 23 MG/DL (ref 7–18)
BUN/CREAT SERPL: 21.69 RATIO (ref 7–18)
CALCIUM SERPL-MCNC: 8.7 MG/DL (ref 8.8–10.5)
CHLORIDE SERPL-SCNC: 102 MMOL/L (ref 100–108)
CO2 SERPL-SCNC: 32 MMOL/L (ref 21–32)
CREAT SERPL-MCNC: 1.06 MG/DL (ref 0.7–1.3)
EOSINOPHIL NFR BLD: 3.3 % (ref 1–3)
ERYTHROCYTE [DISTWIDTH] IN BLOOD BY AUTOMATED COUNT: 14.6 % (ref 12.5–18)
GFR ESTIMATION: > 60
GLOBULIN: 4.6 G/DL (ref 2.3–3.5)
GLUCOSE SERPL-MCNC: 102 MG/DL (ref 70–110)
HCT VFR BLD AUTO: 36.5 % (ref 42–52)
HGB BLD-MCNC: 12.3 G/DL (ref 14–18)
LYMPHOCYTES NFR BLD: 23.2 % (ref 25–40)
MCH RBC QN AUTO: 26.8 PG (ref 27–31.2)
MCHC RBC AUTO-ENTMCNC: 33.7 G/DL (ref 31.8–35.4)
MCV RBC AUTO: 79.5 FL (ref 80–97)
MONOCYTES NFR BLD: 13 % (ref 1–15)
NEUTROPHILS # BLD AUTO: 3.54 10*3/UL (ref 1.8–7.7)
NEUTROPHILS NFR BLD: 59 % (ref 37–80)
NUCLEATED RED BLOOD CELLS: 0 %
PLATELETS: 225 10*3/UL (ref 142–424)
POTASSIUM SERPL-SCNC: 4.5 MMOL/L (ref 3.6–5.2)
PROT SERPL-MCNC: 8 G/DL (ref 6.4–8.2)
RBC # BLD AUTO: 4.59 10*6/UL (ref 4.7–6.1)
SODIUM BLD-SCNC: 139 MMOL/L (ref 135–145)
WBC # BLD: 6 10*3/UL (ref 4.6–10.2)

## 2024-03-19 PROCEDURE — 99214 OFFICE O/P EST MOD 30 MIN: CPT | Mod: S$GLB,,, | Performed by: NURSE PRACTITIONER

## 2024-03-19 NOTE — PROGRESS NOTES
MEDICAL ONCOLOGY FOLLOW UP CONSULTATION NOTE    Patient ID: Zeferino Mclain Jr. is a 68 y.o. male.    Chief Complaint:  Rectal Cancer arising from a polyp    HPI:  Patient is a 68-year-old male former smoker with recent diagnosis of rectal cancer after he underwent screening colonoscopy which showed findings as below.  Patient presents to our clinic today for further evaluation voices no acute complaints.        Pathology:  2023     A. Pedunculated polyp at 25 cm:  Tubulovillous adenoma    B. Polyp at 2nd wall rectum:   Adenocarcinoma, moderately differentiated    Synoptic report:   Histologic Type adenocarcinoma  Histologic grade:  G2, moderately differentiated  Tumor size: 2 mm  Tumor extent:  Invades lamina propria  Lymphovascular invasion: Not identified  All margins negative for invasive carcinoma    Mismatch repair (MMR) IHC panel:  Mismatch repair (MMR) IHC panel:  MLH1, MSH2, MSH6 and PMS2 all show intact nuclear expression by immunohistochemistry.  There is no evidence of mismatch repair deficiency in this specimen      Imaging:     PET scan:  2023  Multiple abnormal lung nodules in the upper right paramidline chest medially and 1 in the lower lateral aspect of the right middle lung.  They all show abnormal tracer uptake.  Differential diagnosis may include immature granuloma but malignancy is not excluded  There are no abnormal areas of tracer uptake to suggest malignancy or metastatic disease      Past Medical History:   Diagnosis Date    Rectal cancer      Family History   Problem Relation Age of Onset    Dementia Mother     Lung cancer Father     Breast cancer Sister      Social History     Socioeconomic History    Marital status:    Tobacco Use    Smoking status: Former     Current packs/day: 0.00     Average packs/day: 0.5 packs/day for 30.0 years (15.0 ttl pk-yrs)     Types: Cigarettes     Start date: 1992     Quit date: 2022     Years since quittin.1     Passive  exposure: Past    Smokeless tobacco: Never   Substance and Sexual Activity    Alcohol use: Yes     Comment: SOCIAL    Drug use: Never    Sexual activity: Yes     Partners: Female     Past Surgical History:   Procedure Laterality Date    COLONOSCOPY      ESOPHAGOGASTRODUODENOSCOPY      FLEXIBLE SIGMOIDOSCOPY N/A 08/21/2023    Procedure: SIGMOIDOSCOPY, FLEXIBLE;  Surgeon: FLACO Champagne MD;  Location: [facility];  Service: Endoscopy;  Laterality: N/A;  Post op: rectal polyp w/rectal cancer    FLEXIBLE SIGMOIDOSCOPY  9/8/2023    Procedure: SIGMOIDOSCOPY, FLEXIBLE;  Surgeon: FLACO Champagne MD;  Location: [facility];  Service: General;;    LUNG BIOPSY      ROBOTIC TAMIS (TRANSANAL MINIMALLY INVASIVE SURGERY) N/A 9/8/2023    Procedure: ROBOTIC TAMIS (TRANSANAL MINIMALLY INVASIVE SURGERY);  Surgeon: FLACO Champagne MD;  Location: [facility];  Service: General;  Laterality: N/A;         Review of systems:  Review of Systems   Constitutional:  Negative for activity change, appetite change, chills, diaphoresis, fatigue and unexpected weight change.   HENT:  Negative for congestion, facial swelling, hearing loss, mouth sores, trouble swallowing and voice change.    Eyes:  Negative for photophobia, pain, discharge and itching.   Respiratory:  Negative for apnea, cough, choking, chest tightness and shortness of breath.    Cardiovascular:  Negative for chest pain, palpitations and leg swelling.   Gastrointestinal:  Negative for abdominal distention, abdominal pain, anal bleeding and blood in stool.   Endocrine: Negative for cold intolerance, heat intolerance, polydipsia and polyphagia.   Genitourinary:  Negative for difficulty urinating, dysuria, flank pain and hematuria.   Musculoskeletal:  Negative for arthralgias, back pain, joint swelling, myalgias, neck pain and neck stiffness.   Skin:  Negative for color change, pallor and wound.   Allergic/Immunologic: Negative for environmental allergies, food  allergies and immunocompromised state.   Neurological:  Negative for dizziness, seizures, facial asymmetry, speech difficulty, light-headedness, numbness and headaches.   Hematological:  Negative for adenopathy. Does not bruise/bleed easily.   Psychiatric/Behavioral:  Negative for agitation, behavioral problems, confusion, decreased concentration and sleep disturbance.              Physical Exam  Vitals and nursing note reviewed.   Constitutional:       General: He is not in acute distress.     Appearance: Normal appearance. He is not ill-appearing.   HENT:      Head: Normocephalic and atraumatic.      Nose: No congestion or rhinorrhea.   Eyes:      General: No scleral icterus.     Extraocular Movements: Extraocular movements intact.      Pupils: Pupils are equal, round, and reactive to light.   Cardiovascular:      Rate and Rhythm: Normal rate and regular rhythm.      Pulses: Normal pulses.      Heart sounds: Normal heart sounds. No murmur heard.     No gallop.   Pulmonary:      Effort: Pulmonary effort is normal. No respiratory distress.      Breath sounds: Normal breath sounds. No stridor. No wheezing or rhonchi.   Abdominal:      General: Bowel sounds are normal. There is no distension.      Palpations: There is no mass.      Tenderness: There is no abdominal tenderness. There is no guarding.   Musculoskeletal:         General: No swelling, tenderness, deformity or signs of injury. Normal range of motion.      Cervical back: Normal range of motion and neck supple. No rigidity. No muscular tenderness.      Right lower leg: No edema.      Left lower leg: No edema.   Skin:     General: Skin is warm.      Coloration: Skin is not jaundiced or pale.      Findings: No bruising or lesion.   Neurological:      General: No focal deficit present.      Mental Status: He is alert and oriented to person, place, and time.      Cranial Nerves: No cranial nerve deficit.      Sensory: No sensory deficit.      Motor: No weakness.       Gait: Gait normal.   Psychiatric:         Mood and Affect: Mood normal.         Behavior: Behavior normal.         Thought Content: Thought content normal.                                 There were no vitals filed for this visit.    There is no height or weight on file to calculate BSA.    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  No high-risk features and no lymphovascular invasion. pT1  == Discussed with patient discussed with patient in detail management options for pedunculated polyp with invasive cancer component and recommendation for observation if favorable histologic features and clear margins pT1 on NCCN guidelines.  == 9/5/23: Patient  discussed in multidisciplinary tumor board and the consensus decision to continue with observation with close surveillance as far as adenocarcinoma from the rectal polyp is concerned.  He needs to be evaluated for incidental pulmonary nodules likely secondary to inflammatory/infectious process noted on imaging and follows with pulmonary.  == 12/5/23: doing well with no new c/o. Has upcoming appointment with Dr Tracy next week for routine f/u. He denies any melena, abdominal pain or changes in bowel habits. Labs reviewed anemia improving No CEA available today  ==03/19/2024: Labs drawn same day of appt, no cea available. CEA from December 2023 was 1.0.  Pt is due for repeat colonoscopy and is scheduled for one with Dr. JAYLYN Puckett on 3/28/2024.  He had MRI pelvis in December 2023 which did not show any evidence of residual or recurrent disease. Plan to continue observation    2. Lung nodule, multiple  - S/p bronchoscopy, no evidence of malignancy.  ? If MAC is active disease versus colonization.  Given that patient is asymptomatic would be reasonable to observe for now per Dr Trevizo's recommendation  - quit smoking 2 years ago     Plan:   Continue observation  Cbc  cmp      Total time spent in counseling and discussion about further management options including relevant lab work, treatment,  prognosis, medications and intended side effects was more than 25 minutes. More than 50% of the time was spent on counseling and coordination of care.  I spent a total of 25 minutes on the day of the visit.This includes face to face time and non-face to face time preparing to see the patient (eg, review of tests), Obtaining and/or reviewing separately obtained history, Documenting clinical information in the electronic or other health record, Independently interpreting resultsand communicating results to the patient/family/caregiver, or Care coordination.

## 2024-03-21 LAB — CEA SERPL-MCNC: 0.8 NG/ML

## 2024-05-01 DIAGNOSIS — Z01.818 PREOP EXAMINATION: Primary | ICD-10-CM

## 2024-05-02 ENCOUNTER — CLINICAL SUPPORT (OUTPATIENT)
Dept: RESPIRATORY THERAPY | Facility: HOSPITAL | Age: 69
End: 2024-05-02
Attending: ANESTHESIOLOGY
Payer: OTHER GOVERNMENT

## 2024-05-02 DIAGNOSIS — Z01.818 PREOP EXAMINATION: ICD-10-CM

## 2024-05-02 LAB
OHS QRS DURATION: 106 MS
OHS QTC CALCULATION: 379 MS

## 2024-05-02 PROCEDURE — 93005 ELECTROCARDIOGRAM TRACING: CPT

## 2024-05-02 PROCEDURE — 93010 ELECTROCARDIOGRAM REPORT: CPT | Mod: ,,, | Performed by: INTERNAL MEDICINE

## 2024-05-02 NOTE — DISCHARGE INSTRUCTIONS
Follow prep on Sunday. Clear liquids only. Nothing by mouth after midnight.         INSTRUCTIONS  AFTER A COLONOSCOPY/EGD    NO DRIVING X 24 HOURS. NOTIFY YOUR DOCTOR WITH     ABDOMINAL PAIN UNRELIEVED BY  PASSING GAS,   FEVER WITHIN 24 HOURS, OR LARGE AMOUNT OF BLEEDING.

## 2024-05-04 ENCOUNTER — ANESTHESIA EVENT (OUTPATIENT)
Dept: SURGERY | Facility: HOSPITAL | Age: 69
End: 2024-05-04
Payer: OTHER GOVERNMENT

## 2024-05-04 NOTE — ANESTHESIA PREPROCEDURE EVALUATION
05/04/2024  Zeferino Mclain Jr. is a 68 y.o., male.      Pre-op Assessment    I have reviewed the Patient Summary Reports.     I have reviewed the Nursing Notes. I have reviewed the NPO Status.   I have reviewed the Medications.     Review of Systems  Anesthesia Hx:             Denies Family Hx of Anesthesia complications.    Denies Personal Hx of Anesthesia complications.                    Social:  Smoker, Alcohol Use       Hematology/Oncology:    Oncology Normal    -- Anemia:                              Oncology Comments: H/o rectal Ca     EENT/Dental:  EENT/Dental Normal           Cardiovascular:  Cardiovascular Normal                  ECG has been reviewed.                          Pulmonary:  Pulmonary Normal                       Renal/:  Renal/ Normal                 Hepatic/GI:  Hepatic/GI Normal                 Musculoskeletal:  Musculoskeletal Normal                Neurological:  Neurology Normal                                      Endocrine:  Endocrine Normal            Dermatological:  Skin Normal    Psych:  Psychiatric Normal                    Physical Exam  General: Cooperative, Alert and Oriented    Airway:  Mallampati: II   Mouth Opening: Normal  TM Distance: Normal  Tongue: Normal  Neck ROM: Normal ROM    Dental:  Intact        Anesthesia Plan  Type of Anesthesia, risks & benefits discussed:    Anesthesia Type: Gen Natural Airway  Intra-op Monitoring Plan: Standard ASA Monitors  Post Op Pain Control Plan:   (medical reason for not using multimodal pain management)  Induction:  IV  Informed Consent: Informed consent signed with the Patient and all parties understand the risks and agree with anesthesia plan.  All questions answered. Patient consented to blood products? Yes  ASA Score: 2    Ready For Surgery From Anesthesia Perspective.     .

## 2024-05-06 ENCOUNTER — HOSPITAL ENCOUNTER (OUTPATIENT)
Facility: HOSPITAL | Age: 69
Discharge: HOME | End: 2024-05-06
Attending: SURGERY | Admitting: SURGERY
Payer: OTHER GOVERNMENT

## 2024-05-06 ENCOUNTER — ANESTHESIA (OUTPATIENT)
Dept: SURGERY | Facility: HOSPITAL | Age: 69
End: 2024-05-06
Payer: OTHER GOVERNMENT

## 2024-05-06 VITALS
OXYGEN SATURATION: 98 % | RESPIRATION RATE: 18 BRPM | SYSTOLIC BLOOD PRESSURE: 113 MMHG | TEMPERATURE: 98 F | WEIGHT: 160.06 LBS | BODY MASS INDEX: 25.72 KG/M2 | HEIGHT: 66 IN | HEART RATE: 49 BPM | DIASTOLIC BLOOD PRESSURE: 64 MMHG

## 2024-05-06 DIAGNOSIS — C20 RECTAL CANCER: ICD-10-CM

## 2024-05-06 PROCEDURE — 63600175 PHARM REV CODE 636 W HCPCS: Performed by: ANESTHESIOLOGY

## 2024-05-06 PROCEDURE — 45331 SIGMOIDOSCOPY AND BIOPSY: CPT | Performed by: SURGERY

## 2024-05-06 PROCEDURE — D9220A PRA ANESTHESIA: Mod: ,,, | Performed by: NURSE ANESTHETIST, CERTIFIED REGISTERED

## 2024-05-06 PROCEDURE — 37000008 HC ANESTHESIA 1ST 15 MINUTES: Performed by: SURGERY

## 2024-05-06 PROCEDURE — 27201423 OPTIME MED/SURG SUP & DEVICES STERILE SUPPLY: Performed by: SURGERY

## 2024-05-06 PROCEDURE — 25000003 PHARM REV CODE 250: Performed by: NURSE ANESTHETIST, CERTIFIED REGISTERED

## 2024-05-06 PROCEDURE — 63600175 PHARM REV CODE 636 W HCPCS: Performed by: NURSE ANESTHETIST, CERTIFIED REGISTERED

## 2024-05-06 PROCEDURE — 37000009 HC ANESTHESIA EA ADD 15 MINS: Performed by: SURGERY

## 2024-05-06 RX ORDER — PROPOFOL 10 MG/ML
VIAL (ML) INTRAVENOUS
Status: DISCONTINUED | OUTPATIENT
Start: 2024-05-06 | End: 2024-05-06

## 2024-05-06 RX ORDER — FENTANYL CITRATE 50 UG/ML
INJECTION, SOLUTION INTRAMUSCULAR; INTRAVENOUS
Status: DISCONTINUED | OUTPATIENT
Start: 2024-05-06 | End: 2024-05-06

## 2024-05-06 RX ORDER — SODIUM CHLORIDE, SODIUM LACTATE, POTASSIUM CHLORIDE, CALCIUM CHLORIDE 600; 310; 30; 20 MG/100ML; MG/100ML; MG/100ML; MG/100ML
INJECTION, SOLUTION INTRAVENOUS CONTINUOUS
Status: DISCONTINUED | OUTPATIENT
Start: 2024-05-06 | End: 2024-05-06 | Stop reason: HOSPADM

## 2024-05-06 RX ORDER — LIDOCAINE HYDROCHLORIDE 20 MG/ML
INJECTION, SOLUTION EPIDURAL; INFILTRATION; INTRACAUDAL; PERINEURAL
Status: DISCONTINUED | OUTPATIENT
Start: 2024-05-06 | End: 2024-05-06

## 2024-05-06 RX ADMIN — PROPOFOL 30 MG: 10 INJECTION, EMULSION INTRAVENOUS at 08:05

## 2024-05-06 RX ADMIN — LIDOCAINE HYDROCHLORIDE 5 ML: 20 INJECTION, SOLUTION EPIDURAL; INFILTRATION; INTRACAUDAL; PERINEURAL at 07:05

## 2024-05-06 RX ADMIN — SODIUM CHLORIDE, POTASSIUM CHLORIDE, SODIUM LACTATE AND CALCIUM CHLORIDE: 600; 310; 30; 20 INJECTION, SOLUTION INTRAVENOUS at 07:05

## 2024-05-06 RX ADMIN — PROPOFOL 70 MG: 10 INJECTION, EMULSION INTRAVENOUS at 07:05

## 2024-05-06 RX ADMIN — PROPOFOL 50 MG: 10 INJECTION, EMULSION INTRAVENOUS at 08:05

## 2024-05-06 RX ADMIN — FENTANYL CITRATE 100 MCG: 50 INJECTION, SOLUTION INTRAMUSCULAR; INTRAVENOUS at 07:05

## 2024-05-06 NOTE — OP NOTE
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  Biopsy taken.   Grade 2, irritated, internal hemorrhoids     Procedure:  Patient was brought to the endoscopy suite and placed in left lateral decubitus position.  Sedation was provided via IV.  Time-out was carried out and agreed upon.  Digital rectal exam was performed with findings as noted above.  Scope was advanced through the anorectum into the distal sigmoid colon.  The distal sigmoid colon was normal-appearing.  The scope was retrieved into the rectum, which was distended with air.  The walls were evaluated, and no significant pathology was identified.  The lowest rectal valve demonstrated an area of tattoo and also scarring consistent with previous TAMIS procedure.  There was no gross suggestion of recurrence.  Biopsy was taken regardless with cold forceps.  Retroflexion demonstrated irritated grade 2 internal hemorrhoids irritation is likely from the enema/prep.  The scope was retrieved.      Patient tolerated the procedure well without complication.

## 2024-05-06 NOTE — ANESTHESIA POSTPROCEDURE EVALUATION
Anesthesia Post Evaluation    Patient: Zeferino Mclain Jr.    Procedure(s) Performed: Procedure(s) (LRB):  SIGMOIDOSCOPY, FLEXIBLE (N/A)  SIGMOIDOSCOPY, WITH BIOPSY (N/A)    Final Anesthesia Type: general      Patient location during evaluation: OPS  Patient participation: Yes- Able to Participate  Level of consciousness: awake and alert  Post-procedure vital signs: reviewed and stable  Pain management: adequate  Airway patency: patent  GUILLERMO mitigation strategies: Multimodal analgesia  PONV status at discharge: No PONV  Anesthetic complications: no      Cardiovascular status: blood pressure returned to baseline  Respiratory status: unassisted  Hydration status: euvolemic  Follow-up not needed.              Vitals Value Taken Time   /80 05/06/24 0642   Temp 36.3 °C (97.4 °F) 05/06/24 0642   Pulse 56 05/06/24 0642   Resp 18 05/06/24 0642   SpO2 99 % 05/06/24 0642         No case tracking events are documented in the log.      Pain/Breanna Score: No data recorded

## 2024-05-06 NOTE — BRIEF OP NOTE
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  Biopsy taken.   Grade 2, irritated, internal hemorrhoids    Estimated Blood Loss: 0 mL         Specimens:   Specimen (24h ago, onward)       Start     Ordered    05/06/24 0808  Specimen to Pathology  RELEASE UPON ORDERING        References:    Click here for ordering Quick Tip   Question:  Release to patient  Answer:  Immediate    05/06/24 0808                      Discharge Note    OUTCOME: Patient tolerated treatment/procedure well without complication and is now ready for discharge.    DISPOSITION: Home or Self Care    FINAL DIAGNOSIS:  <principal problem not specified>    FOLLOWUP: In clinic    DISCHARGE INSTRUCTIONS:  No discharge procedures on file.

## 2024-05-07 LAB — PSYCHE PATHOLOGY RESULT: NORMAL

## 2024-06-10 DIAGNOSIS — C20 RECTAL CANCER: Primary | ICD-10-CM

## 2024-06-11 ENCOUNTER — OFFICE VISIT (OUTPATIENT)
Dept: HEMATOLOGY/ONCOLOGY | Facility: CLINIC | Age: 69
End: 2024-06-11
Payer: OTHER GOVERNMENT

## 2024-06-11 VITALS
DIASTOLIC BLOOD PRESSURE: 75 MMHG | HEART RATE: 51 BPM | SYSTOLIC BLOOD PRESSURE: 165 MMHG | BODY MASS INDEX: 25.34 KG/M2 | HEIGHT: 66 IN | WEIGHT: 157.69 LBS | OXYGEN SATURATION: 98 % | RESPIRATION RATE: 18 BRPM

## 2024-06-11 DIAGNOSIS — Z85.048 HISTORY OF RECTAL CANCER: Primary | ICD-10-CM

## 2024-06-11 DIAGNOSIS — D50.0 ANEMIA DUE TO CHRONIC BLOOD LOSS: ICD-10-CM

## 2024-06-11 LAB
ALBUMIN SERPL BCP-MCNC: 3.4 G/DL (ref 3.4–5)
ALBUMIN/GLOBULIN RATIO: 0.77 RATIO (ref 1.1–1.8)
ALP SERPL-CCNC: 123 U/L (ref 46–116)
ALT SERPL W P-5'-P-CCNC: 15 U/L (ref 12–78)
ANION GAP SERPL CALC-SCNC: 5 MMOL/L (ref 3–11)
AST SERPL-CCNC: 12 U/L (ref 15–37)
BASOPHILS NFR BLD: 0.8 % (ref 0–3)
BILIRUB SERPL-MCNC: 0.4 MG/DL (ref 0–1)
BUN SERPL-MCNC: 16 MG/DL (ref 7–18)
BUN/CREAT SERPL: 17.02 RATIO (ref 7–18)
CALCIUM SERPL-MCNC: 8.8 MG/DL (ref 8.8–10.5)
CHLORIDE SERPL-SCNC: 105 MMOL/L (ref 100–108)
CO2 SERPL-SCNC: 30 MMOL/L (ref 21–32)
CREAT SERPL-MCNC: 0.94 MG/DL (ref 0.7–1.3)
EOSINOPHIL NFR BLD: 1.2 % (ref 1–3)
ERYTHROCYTE [DISTWIDTH] IN BLOOD BY AUTOMATED COUNT: 14 % (ref 12.5–18)
GFR ESTIMATION: > 60
GLOBULIN: 4.4 G/DL (ref 2.3–3.5)
GLUCOSE SERPL-MCNC: 93 MG/DL (ref 70–110)
HCT VFR BLD AUTO: 35.3 % (ref 42–52)
HGB BLD-MCNC: 12.2 G/DL (ref 14–18)
LYMPHOCYTES NFR BLD: 15.9 % (ref 25–40)
MCH RBC QN AUTO: 27.4 PG (ref 27–31.2)
MCHC RBC AUTO-ENTMCNC: 34.6 G/DL (ref 31.8–35.4)
MCV RBC AUTO: 79.3 FL (ref 80–97)
MONOCYTES NFR BLD: 8.6 % (ref 1–15)
NEUTROPHILS # BLD AUTO: 6.93 10*3/UL (ref 1.8–7.7)
NEUTROPHILS NFR BLD: 73.2 % (ref 37–80)
NUCLEATED RED BLOOD CELLS: 0 %
PLATELETS: 205 10*3/UL (ref 142–424)
POTASSIUM SERPL-SCNC: 4.3 MMOL/L (ref 3.6–5.2)
PROT SERPL-MCNC: 7.8 G/DL (ref 6.4–8.2)
RBC # BLD AUTO: 4.45 10*6/UL (ref 4.7–6.1)
SODIUM BLD-SCNC: 140 MMOL/L (ref 135–145)
WBC # BLD: 9.5 10*3/UL (ref 4.6–10.2)

## 2024-06-11 PROCEDURE — 99214 OFFICE O/P EST MOD 30 MIN: CPT | Mod: S$GLB,,, | Performed by: NURSE PRACTITIONER

## 2024-06-11 NOTE — PROGRESS NOTES
MEDICAL ONCOLOGY FOLLOW UP CONSULTATION NOTE    Patient ID: Zeferino Mclain Jr. is a 68 y.o. male.    Chief Complaint:  Rectal Cancer arising from a polyp    HPI:  Patient is a 68-year-old male former smoker with recent diagnosis of rectal cancer after he underwent screening colonoscopy which showed findings as below.  Patient presents to our clinic today for further evaluation voices no acute complaints.        Pathology:  2023     A. Pedunculated polyp at 25 cm:  Tubulovillous adenoma    B. Polyp at 2nd wall rectum:   Adenocarcinoma, moderately differentiated    Synoptic report:   Histologic Type adenocarcinoma  Histologic grade:  G2, moderately differentiated  Tumor size: 2 mm  Tumor extent:  Invades lamina propria  Lymphovascular invasion: Not identified  All margins negative for invasive carcinoma    Mismatch repair (MMR) IHC panel:  Mismatch repair (MMR) IHC panel:  MLH1, MSH2, MSH6 and PMS2 all show intact nuclear expression by immunohistochemistry.  There is no evidence of mismatch repair deficiency in this specimen      Imaging:     PET scan:  2023  Multiple abnormal lung nodules in the upper right paramidline chest medially and 1 in the lower lateral aspect of the right middle lung.  They all show abnormal tracer uptake.  Differential diagnosis may include immature granuloma but malignancy is not excluded  There are no abnormal areas of tracer uptake to suggest malignancy or metastatic disease      Past Medical History:   Diagnosis Date    Rectal cancer      Family History   Problem Relation Name Age of Onset    Dementia Mother      Lung cancer Father      Breast cancer Sister       Social History     Socioeconomic History    Marital status:    Tobacco Use    Smoking status: Former     Current packs/day: 0.00     Average packs/day: 0.5 packs/day for 30.0 years (15.0 ttl pk-yrs)     Types: Cigarettes     Start date: 1992     Quit date: 2022     Years since quittin.3     Passive  exposure: Past    Smokeless tobacco: Never   Substance and Sexual Activity    Alcohol use: Yes     Comment: SOCIAL    Drug use: Never    Sexual activity: Yes     Partners: Female     Past Surgical History:   Procedure Laterality Date    COLONOSCOPY      ESOPHAGOGASTRODUODENOSCOPY      FLEXIBLE SIGMOIDOSCOPY N/A 08/21/2023    Procedure: SIGMOIDOSCOPY, FLEXIBLE;  Surgeon: FLACO Champagne MD;  Location: [facility];  Service: Endoscopy;  Laterality: N/A;  Post op: rectal polyp w/rectal cancer    FLEXIBLE SIGMOIDOSCOPY  9/8/2023    Procedure: SIGMOIDOSCOPY, FLEXIBLE;  Surgeon: FLACO Champagne MD;  Location: [facility];  Service: General;;    FLEXIBLE SIGMOIDOSCOPY N/A 5/6/2024    Procedure: SIGMOIDOSCOPY, FLEXIBLE;  Surgeon: FLACO Champagne MD;  Location: [facility];  Service: Endoscopy;  Laterality: N/A;  POST-OP: NO RECURRENCE    LUNG BIOPSY      ROBOTIC TAMIS (TRANSANAL MINIMALLY INVASIVE SURGERY) N/A 9/8/2023    Procedure: ROBOTIC TAMIS (TRANSANAL MINIMALLY INVASIVE SURGERY);  Surgeon: FLACO Champagne MD;  Location: [facility];  Service: General;  Laterality: N/A;    SIGMOIDOSCOPY, WITH BIOPSY N/A 5/6/2024    Procedure: SIGMOIDOSCOPY, WITH BIOPSY;  Surgeon: FLACO Champagne MD;  Location: [facility];  Service: Endoscopy;  Laterality: N/A;  POST-OP: LOW RECTUM BIOPSY         Review of systems:  Review of Systems   Constitutional:  Negative for activity change, appetite change, chills, diaphoresis, fatigue and unexpected weight change.   HENT:  Negative for congestion, facial swelling, hearing loss, mouth sores, trouble swallowing and voice change.    Eyes:  Negative for photophobia, pain, discharge and itching.   Respiratory:  Negative for apnea, cough, choking, chest tightness and shortness of breath.    Cardiovascular:  Negative for chest pain, palpitations and leg swelling.   Gastrointestinal:  Negative for abdominal distention, abdominal pain, anal bleeding and blood in stool.    Endocrine: Negative for cold intolerance, heat intolerance, polydipsia and polyphagia.   Genitourinary:  Negative for difficulty urinating, dysuria, flank pain and hematuria.   Musculoskeletal:  Negative for arthralgias, back pain, joint swelling, myalgias, neck pain and neck stiffness.   Skin:  Negative for color change, pallor and wound.   Allergic/Immunologic: Negative for environmental allergies, food allergies and immunocompromised state.   Neurological:  Negative for dizziness, seizures, facial asymmetry, speech difficulty, light-headedness, numbness and headaches.   Hematological:  Negative for adenopathy. Does not bruise/bleed easily.   Psychiatric/Behavioral:  Negative for agitation, behavioral problems, confusion, decreased concentration and sleep disturbance.              Physical Exam  Vitals and nursing note reviewed.   Constitutional:       General: He is not in acute distress.     Appearance: Normal appearance. He is not ill-appearing.   HENT:      Head: Normocephalic and atraumatic.      Nose: No congestion or rhinorrhea.   Eyes:      General: No scleral icterus.     Extraocular Movements: Extraocular movements intact.      Pupils: Pupils are equal, round, and reactive to light.   Cardiovascular:      Rate and Rhythm: Normal rate and regular rhythm.      Pulses: Normal pulses.      Heart sounds: Normal heart sounds. No murmur heard.     No gallop.   Pulmonary:      Effort: Pulmonary effort is normal. No respiratory distress.      Breath sounds: Normal breath sounds. No stridor. No wheezing or rhonchi.   Abdominal:      General: Bowel sounds are normal. There is no distension.      Palpations: There is no mass.      Tenderness: There is no abdominal tenderness. There is no guarding.   Musculoskeletal:         General: No swelling, tenderness, deformity or signs of injury. Normal range of motion.      Cervical back: Normal range of motion and neck supple. No rigidity. No muscular tenderness.       Right lower leg: No edema.      Left lower leg: No edema.   Skin:     General: Skin is warm.      Coloration: Skin is not jaundiced or pale.      Findings: No bruising or lesion.   Neurological:      General: No focal deficit present.      Mental Status: He is alert and oriented to person, place, and time.      Cranial Nerves: No cranial nerve deficit.      Sensory: No sensory deficit.      Motor: No weakness.      Gait: Gait normal.   Psychiatric:         Mood and Affect: Mood normal.         Behavior: Behavior normal.         Thought Content: Thought content normal.                                 There were no vitals filed for this visit.    There is no height or weight on file to calculate BSA.    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  No high-risk features and no lymphovascular invasion. pT1  == Discussed with patient discussed with patient in detail management options for pedunculated polyp with invasive cancer component and recommendation for observation if favorable histologic features and clear margins pT1 on NCCN guidelines.  == 9/5/23: Patient  discussed in multidisciplinary tumor board and the consensus decision to continue with observation with close surveillance as far as adenocarcinoma from the rectal polyp is concerned.  He needs to be evaluated for incidental pulmonary nodules likely secondary to inflammatory/infectious process noted on imaging and follows with pulmonary.  == 12/5/23: doing well with no new c/o. Has upcoming appointment with Dr Tracy next week for routine f/u. He denies any melena, abdominal pain or changes in bowel habits. Labs reviewed anemia improving No CEA available today  ==03/19/2024: Labs drawn same day of appt, no cea available. CEA from December 2023 was 1.0.  Pt is due for repeat colonoscopy and is scheduled for one with Dr. JAYLYN Puckett on 3/28/2024.  He had MRI pelvis in  December 2023 which did not show any evidence of residual or recurrent disease. Plan to continue observation  ==06/11/2024: Pt saw Dr. JAYLYN Latif on 5/5/24 and had proctoscopy, has colonoscopy scheduled in September. Cotninued mild anemia hgb 12.2, will check iron studies with next labs, denies fatigue, no dizziness or dyspnea.     2. Lung nodule, multiple  - S/p bronchoscopy, no evidence of malignancy.  ? If MAC is active disease versus colonization.  Given that patient is asymptomatic would be reasonable to observe for now per Dr Trevizo's recommendation  - quit smoking 2 years ago     3. Anemia  ==Mild, asymptomatic  ==will check iron studies    Plan:   Continue observation  Cbc cmp, cea, iron studies  Continue to follow up with Dr. Puckett, colonoscopy scheduled sept 2024      Total time spent in counseling and discussion about further management options including relevant lab work, treatment,  prognosis, medications and intended side effects was more than 30 minutes. More than 50% of the time was spent on counseling and coordination of care.  I spent a total of 30 minutes on the day of the visit.This includes face to face time and non-face to face time preparing to see the patient (eg, review of tests), Obtaining and/or reviewing separately obtained history, Documenting clinical information in the electronic or other health record, Independently interpreting resultsand communicating results to the patient/family/caregiver, or Care coordination.

## 2024-06-27 ENCOUNTER — LAB VISIT (OUTPATIENT)
Dept: LAB | Facility: HOSPITAL | Age: 69
End: 2024-06-27
Attending: ANESTHESIOLOGY
Payer: OTHER GOVERNMENT

## 2024-06-27 ENCOUNTER — CLINICAL SUPPORT (OUTPATIENT)
Dept: RESPIRATORY THERAPY | Facility: HOSPITAL | Age: 69
End: 2024-06-27
Attending: ANESTHESIOLOGY
Payer: OTHER GOVERNMENT

## 2024-06-27 DIAGNOSIS — L72.3 SEBACEOUS CYST: ICD-10-CM

## 2024-06-27 DIAGNOSIS — L72.3 SEBACEOUS CYST: Primary | ICD-10-CM

## 2024-06-27 LAB
ALBUMIN SERPL-MCNC: 3.7 G/DL (ref 3.4–4.8)
ALBUMIN/GLOB SERPL: 0.8 RATIO (ref 1.1–2)
ALP SERPL-CCNC: 114 UNIT/L (ref 40–150)
ALT SERPL-CCNC: 9 UNIT/L (ref 0–55)
ANION GAP SERPL CALC-SCNC: 5 MEQ/L
AST SERPL-CCNC: 15 UNIT/L (ref 5–34)
BASOPHILS # BLD AUTO: 0.08 X10(3)/MCL
BASOPHILS NFR BLD AUTO: 1.4 %
BILIRUB SERPL-MCNC: 0.4 MG/DL
BUN SERPL-MCNC: 18 MG/DL (ref 8.4–25.7)
CALCIUM SERPL-MCNC: 9.5 MG/DL (ref 8.8–10)
CHLORIDE SERPL-SCNC: 107 MMOL/L (ref 98–107)
CO2 SERPL-SCNC: 27 MMOL/L (ref 23–31)
CREAT SERPL-MCNC: 1 MG/DL (ref 0.73–1.18)
CREAT/UREA NIT SERPL: 18
EOSINOPHIL # BLD AUTO: 0.14 X10(3)/MCL (ref 0–0.9)
EOSINOPHIL NFR BLD AUTO: 2.4 %
ERYTHROCYTE [DISTWIDTH] IN BLOOD BY AUTOMATED COUNT: 14.1 % (ref 11.5–17)
GFR SERPLBLD CREATININE-BSD FMLA CKD-EPI: >60 ML/MIN/1.73/M2
GLOBULIN SER-MCNC: 4.5 GM/DL (ref 2.4–3.5)
GLUCOSE SERPL-MCNC: 89 MG/DL (ref 82–115)
HCT VFR BLD AUTO: 35.8 % (ref 42–52)
HGB BLD-MCNC: 12.3 G/DL (ref 14–18)
IMM GRANULOCYTES # BLD AUTO: 0.02 X10(3)/MCL (ref 0–0.04)
IMM GRANULOCYTES NFR BLD AUTO: 0.3 %
LYMPHOCYTES # BLD AUTO: 1.32 X10(3)/MCL (ref 0.6–4.6)
LYMPHOCYTES NFR BLD AUTO: 22.6 %
MCH RBC QN AUTO: 27.2 PG (ref 27–31)
MCHC RBC AUTO-ENTMCNC: 34.4 G/DL (ref 33–36)
MCV RBC AUTO: 79 FL (ref 80–94)
MONOCYTES # BLD AUTO: 0.75 X10(3)/MCL (ref 0.1–1.3)
MONOCYTES NFR BLD AUTO: 12.8 %
NEUTROPHILS # BLD AUTO: 3.53 X10(3)/MCL (ref 2.1–9.2)
NEUTROPHILS NFR BLD AUTO: 60.5 %
PLATELET # BLD AUTO: 212 X10(3)/MCL (ref 130–400)
PMV BLD AUTO: 11.8 FL (ref 7.4–10.4)
POTASSIUM SERPL-SCNC: 4.2 MMOL/L (ref 3.5–5.1)
PROT SERPL-MCNC: 8.2 GM/DL (ref 5.8–7.6)
RBC # BLD AUTO: 4.53 X10(6)/MCL (ref 4.7–6.1)
SODIUM SERPL-SCNC: 139 MMOL/L (ref 136–145)
WBC # BLD AUTO: 5.84 X10(3)/MCL (ref 4.5–11.5)

## 2024-06-27 PROCEDURE — 85025 COMPLETE CBC W/AUTO DIFF WBC: CPT

## 2024-06-27 PROCEDURE — 80053 COMPREHEN METABOLIC PANEL: CPT

## 2024-06-27 PROCEDURE — 36415 COLL VENOUS BLD VENIPUNCTURE: CPT

## 2024-06-27 PROCEDURE — 93005 ELECTROCARDIOGRAM TRACING: CPT

## 2024-06-27 PROCEDURE — 93010 ELECTROCARDIOGRAM REPORT: CPT | Mod: ,,, | Performed by: INTERNAL MEDICINE

## 2024-06-29 LAB
OHS QRS DURATION: 108 MS
OHS QTC CALCULATION: 409 MS

## 2024-06-30 ENCOUNTER — ANESTHESIA EVENT (OUTPATIENT)
Dept: SURGERY | Facility: HOSPITAL | Age: 69
End: 2024-06-30
Payer: OTHER GOVERNMENT

## 2024-06-30 RX ORDER — SODIUM CHLORIDE 0.9 % (FLUSH) 0.9 %
10 SYRINGE (ML) INJECTION
OUTPATIENT
Start: 2024-06-30

## 2024-06-30 RX ORDER — HYDROMORPHONE HYDROCHLORIDE 2 MG/ML
0.2 INJECTION, SOLUTION INTRAMUSCULAR; INTRAVENOUS; SUBCUTANEOUS EVERY 5 MIN PRN
OUTPATIENT
Start: 2024-06-30

## 2024-06-30 RX ORDER — FENTANYL CITRATE 50 UG/ML
25 INJECTION, SOLUTION INTRAMUSCULAR; INTRAVENOUS EVERY 5 MIN PRN
OUTPATIENT
Start: 2024-06-30

## 2024-06-30 NOTE — ANESTHESIA PREPROCEDURE EVALUATION
06/30/2024  Zeferino Mclain Jr. is a 68 y.o., male.      Pre-op Assessment    I have reviewed the Patient Summary Reports.     I have reviewed the Nursing Notes. I have reviewed the NPO Status.   I have reviewed the Medications.     Review of Systems  Anesthesia Hx:             Denies Family Hx of Anesthesia complications.    Denies Personal Hx of Anesthesia complications.                    Social:  Smoker, Alcohol Use       Hematology/Oncology:  Hematology Normal   Oncology Normal                                   EENT/Dental:  EENT/Dental Normal           Cardiovascular:  Cardiovascular Normal                  ECG has been reviewed.                          Pulmonary:  Pulmonary Normal                       Renal/:  Renal/ Normal                 Hepatic/GI:  Hepatic/GI Normal                 Musculoskeletal:  Musculoskeletal Normal                Neurological:  Neurology Normal                                      Endocrine:  Endocrine Normal            Dermatological:  Skin Normal    Psych:  Psychiatric Normal                    Physical Exam  General: Cooperative, Alert and Oriented    Airway:  Mallampati: II   Mouth Opening: Normal  TM Distance: Normal  Tongue: Normal  Neck ROM: Normal ROM    Dental:  Intact        Anesthesia Plan  Type of Anesthesia, risks & benefits discussed:    Anesthesia Type: Gen Supraglottic Airway  Intra-op Monitoring Plan: Standard ASA Monitors  Post Op Pain Control Plan: multimodal analgesia  Induction:  IV  Informed Consent: Informed consent signed with the Patient and all parties understand the risks and agree with anesthesia plan.  All questions answered. Patient consented to blood products? Yes  ASA Score: 1    Ready For Surgery From Anesthesia Perspective.     .

## 2024-07-01 ENCOUNTER — ANESTHESIA (OUTPATIENT)
Dept: SURGERY | Facility: HOSPITAL | Age: 69
End: 2024-07-01
Payer: OTHER GOVERNMENT

## 2024-07-01 ENCOUNTER — HOSPITAL ENCOUNTER (OUTPATIENT)
Facility: HOSPITAL | Age: 69
Discharge: HOME | End: 2024-07-01
Attending: SURGERY | Admitting: SURGERY
Payer: OTHER GOVERNMENT

## 2024-07-01 VITALS
HEIGHT: 66 IN | WEIGHT: 158.94 LBS | BODY MASS INDEX: 25.54 KG/M2 | HEART RATE: 46 BPM | OXYGEN SATURATION: 98 % | RESPIRATION RATE: 18 BRPM | DIASTOLIC BLOOD PRESSURE: 77 MMHG | SYSTOLIC BLOOD PRESSURE: 150 MMHG | TEMPERATURE: 97 F

## 2024-07-01 DIAGNOSIS — L72.3 SEBACEOUS CYST: Primary | ICD-10-CM

## 2024-07-01 PROCEDURE — 63600175 PHARM REV CODE 636 W HCPCS: Performed by: ANESTHESIOLOGY

## 2024-07-01 PROCEDURE — 36000706: Performed by: SURGERY

## 2024-07-01 PROCEDURE — 25000003 PHARM REV CODE 250: Performed by: NURSE ANESTHETIST, CERTIFIED REGISTERED

## 2024-07-01 PROCEDURE — 71000015 HC POSTOP RECOV 1ST HR: Performed by: SURGERY

## 2024-07-01 PROCEDURE — 25000003 PHARM REV CODE 250

## 2024-07-01 PROCEDURE — 63600175 PHARM REV CODE 636 W HCPCS: Mod: JZ,JG | Performed by: SURGERY

## 2024-07-01 PROCEDURE — 25000003 PHARM REV CODE 250: Performed by: SURGERY

## 2024-07-01 PROCEDURE — 36000707: Performed by: SURGERY

## 2024-07-01 PROCEDURE — 63600175 PHARM REV CODE 636 W HCPCS: Performed by: NURSE ANESTHETIST, CERTIFIED REGISTERED

## 2024-07-01 PROCEDURE — 37000008 HC ANESTHESIA 1ST 15 MINUTES: Performed by: SURGERY

## 2024-07-01 PROCEDURE — 37000009 HC ANESTHESIA EA ADD 15 MINS: Performed by: SURGERY

## 2024-07-01 RX ORDER — LIDOCAINE HYDROCHLORIDE 20 MG/ML
INJECTION, SOLUTION EPIDURAL; INFILTRATION; INTRACAUDAL; PERINEURAL
Status: DISCONTINUED | OUTPATIENT
Start: 2024-07-01 | End: 2024-07-01

## 2024-07-01 RX ORDER — CLINDAMYCIN PHOSPHATE 900 MG/50ML
900 INJECTION, SOLUTION INTRAVENOUS
Status: COMPLETED | OUTPATIENT
Start: 2024-07-01 | End: 2024-07-01

## 2024-07-01 RX ORDER — PROPOFOL 10 MG/ML
VIAL (ML) INTRAVENOUS
Status: DISCONTINUED | OUTPATIENT
Start: 2024-07-01 | End: 2024-07-01

## 2024-07-01 RX ORDER — BACITRACIN ZINC 500 UNIT/G
OINTMENT (GRAM) TOPICAL
Status: DISCONTINUED | OUTPATIENT
Start: 2024-07-01 | End: 2024-07-01 | Stop reason: HOSPADM

## 2024-07-01 RX ORDER — OXYCODONE HYDROCHLORIDE 5 MG/1
5 TABLET ORAL EVERY 6 HOURS PRN
Qty: 12 TABLET | Refills: 0 | Status: SHIPPED | OUTPATIENT
Start: 2024-07-01

## 2024-07-01 RX ORDER — KETAMINE HYDROCHLORIDE 50 MG/ML
INJECTION, SOLUTION INTRAMUSCULAR; INTRAVENOUS
Status: DISCONTINUED | OUTPATIENT
Start: 2024-07-01 | End: 2024-07-01

## 2024-07-01 RX ORDER — BUPIVACAINE HYDROCHLORIDE 2.5 MG/ML
INJECTION, SOLUTION EPIDURAL; INFILTRATION; INTRACAUDAL
Status: DISCONTINUED | OUTPATIENT
Start: 2024-07-01 | End: 2024-07-01 | Stop reason: HOSPADM

## 2024-07-01 RX ORDER — SODIUM CHLORIDE, SODIUM LACTATE, POTASSIUM CHLORIDE, CALCIUM CHLORIDE 600; 310; 30; 20 MG/100ML; MG/100ML; MG/100ML; MG/100ML
INJECTION, SOLUTION INTRAVENOUS CONTINUOUS
Status: DISCONTINUED | OUTPATIENT
Start: 2024-07-01 | End: 2024-07-01 | Stop reason: HOSPADM

## 2024-07-01 RX ORDER — FENTANYL CITRATE 50 UG/ML
INJECTION, SOLUTION INTRAMUSCULAR; INTRAVENOUS
Status: DISCONTINUED | OUTPATIENT
Start: 2024-07-01 | End: 2024-07-01

## 2024-07-01 RX ORDER — LIDOCAINE HYDROCHLORIDE 10 MG/ML
INJECTION, SOLUTION EPIDURAL; INFILTRATION; INTRACAUDAL; PERINEURAL
Status: DISCONTINUED | OUTPATIENT
Start: 2024-07-01 | End: 2024-07-01 | Stop reason: HOSPADM

## 2024-07-01 RX ADMIN — FENTANYL CITRATE 50 MCG: 50 INJECTION, SOLUTION INTRAMUSCULAR; INTRAVENOUS at 12:07

## 2024-07-01 RX ADMIN — PROPOFOL 50 MG: 10 INJECTION, EMULSION INTRAVENOUS at 12:07

## 2024-07-01 RX ADMIN — PROPOFOL 50 MG: 10 INJECTION, EMULSION INTRAVENOUS at 01:07

## 2024-07-01 RX ADMIN — SODIUM CHLORIDE, POTASSIUM CHLORIDE, SODIUM LACTATE AND CALCIUM CHLORIDE: 600; 310; 30; 20 INJECTION, SOLUTION INTRAVENOUS at 01:07

## 2024-07-01 RX ADMIN — LIDOCAINE HYDROCHLORIDE 60 MG: 20 INJECTION, SOLUTION EPIDURAL; INFILTRATION; INTRACAUDAL; PERINEURAL at 12:07

## 2024-07-01 RX ADMIN — KETAMINE HYDROCHLORIDE 20 MG: 50 INJECTION, SOLUTION, CONCENTRATE INTRAMUSCULAR; INTRAVENOUS at 12:07

## 2024-07-01 RX ADMIN — PROPOFOL 30 MG: 10 INJECTION, EMULSION INTRAVENOUS at 01:07

## 2024-07-01 RX ADMIN — KETAMINE HYDROCHLORIDE 10 MG: 50 INJECTION, SOLUTION, CONCENTRATE INTRAMUSCULAR; INTRAVENOUS at 01:07

## 2024-07-01 RX ADMIN — SODIUM CHLORIDE, POTASSIUM CHLORIDE, SODIUM LACTATE AND CALCIUM CHLORIDE: 600; 310; 30; 20 INJECTION, SOLUTION INTRAVENOUS at 10:07

## 2024-07-01 RX ADMIN — CLINDAMYCIN PHOSPHATE 900 MG: 900 INJECTION, SOLUTION INTRAVENOUS at 12:07

## 2024-07-01 NOTE — H&P
[facility]  Peri Services  General Surgery  History & Physical    Patient Name: Zeferino Mclain Jr.  MRN: [id number]  Admission Date: 7/1/2024  Attending Physician: FLACO Champagne,*   Primary Care Provider: Zarina Martinez MD    Patient information was obtained from Patient and wife.    Subjective:     Chief Complaint/Reason for Admission:  Sebaceous cyst of the back x2.    History of Present Illness:  68-year-old gentleman with sebaceous cyst of the back that is bothersome and has also grown in the last year this ruptures and opens annually.  He is here for excision.        No current facility-administered medications on file prior to encounter.     Current Outpatient Medications on File Prior to Encounter   Medication Sig    APPLE CIDER VINEGAR ORAL Take 1 capsule by mouth every morning.    cyanocobalamin (VITAMIN B-12) 100 MCG tablet Take 100 mcg by mouth every morning.    omega-3 fatty acids/fish oil (FISH OIL-OMEGA-3 FATTY ACIDS) 300-1,000 mg capsule Take 1 capsule by mouth every morning.    vit C/vit E ac/selenium/ginkgo (MEMORY COMPLEX ORAL) Take 1 capsule by mouth every morning.       Review of patient's allergies indicates:   Allergen Reactions    Pcn [penicillins] Rash       Past Medical History:   Diagnosis Date    Rectal cancer      Past Surgical History:   Procedure Laterality Date    COLONOSCOPY      ESOPHAGOGASTRODUODENOSCOPY      FLEXIBLE SIGMOIDOSCOPY N/A 08/21/2023    Procedure: SIGMOIDOSCOPY, FLEXIBLE;  Surgeon: FLACO Champagne MD;  Location: [facility];  Service: Endoscopy;  Laterality: N/A;  Post op: rectal polyp w/rectal cancer    FLEXIBLE SIGMOIDOSCOPY  9/8/2023    Procedure: SIGMOIDOSCOPY, FLEXIBLE;  Surgeon: FLACO Champagne MD;  Location: [facility] OR;  Service: General;;    FLEXIBLE SIGMOIDOSCOPY N/A 5/6/2024    Procedure: SIGMOIDOSCOPY, FLEXIBLE;  Surgeon: FLACO Champagne MD;  Location: [facility];  Service: Endoscopy;  Laterality: N/A;  POST-OP:  NO RECURRENCE    LUNG BIOPSY      ROBOTIC TAMIS (TRANSANAL MINIMALLY INVASIVE SURGERY) N/A 2023    Procedure: ROBOTIC TAMIS (TRANSANAL MINIMALLY INVASIVE SURGERY);  Surgeon: FLACO Champagne MD;  Location: [facility] OR;  Service: General;  Laterality: N/A;    SIGMOIDOSCOPY, WITH BIOPSY N/A 2024    Procedure: SIGMOIDOSCOPY, WITH BIOPSY;  Surgeon: FLACO Champagne MD;  Location: [facility];  Service: Endoscopy;  Laterality: N/A;  POST-OP: LOW RECTUM BIOPSY     Family History       Problem Relation (Age of Onset)    Breast cancer Sister    Dementia Mother    Lung cancer Father          Tobacco Use    Smoking status: Former     Current packs/day: 0.00     Average packs/day: 0.5 packs/day for 30.0 years (15.0 ttl pk-yrs)     Types: Cigarettes     Start date: 1992     Quit date: 2022     Years since quittin.4     Passive exposure: Past    Smokeless tobacco: Never   Substance and Sexual Activity    Alcohol use: Yes     Comment: SOCIAL    Drug use: Never    Sexual activity: Yes     Partners: Female     Review of Systems   Constitutional: Negative.    HENT: Negative.     Eyes: Negative.    Respiratory: Negative.     Cardiovascular: Negative.    Gastrointestinal: Negative.    Endocrine: Negative.    Genitourinary: Negative.    Musculoskeletal:         Sebaceous cyst to the mid back that opens once a year.  It has also recently grown.  It bothers him especially when he is working on his truck.   Skin: Negative.    Allergic/Immunologic: Negative.    Neurological: Negative.    Hematological: Negative.    Psychiatric/Behavioral: Negative.     All other systems reviewed and are negative.    Objective:     Vital Signs (Most Recent):  Temp: 97.4 °F (36.3 °C) (24 1023)  Pulse: (!) 48 (24 1023)  Resp: 18 (24 1023)  BP: (!) 142/65 (24 1023)  SpO2: 97 % (24 1023) Vital Signs (24h Range):  Temp:  [97.4 °F (36.3 °C)] 97.4 °F (36.3 °C)  Pulse:  [48] 48  Resp:  [18] 18  SpO2:  [97 %]  97 %  BP: (142)/(65) 142/65     Weight: 72.1 kg (158 lb 15.2 oz)  Body mass index is 25.66 kg/m².    Physical Exam  Constitutional:       General: He is not in acute distress.     Appearance: He is not toxic-appearing.   HENT:      Head: Normocephalic and atraumatic.      Right Ear: External ear normal.      Left Ear: External ear normal.      Nose: Nose normal.      Mouth/Throat:      Mouth: Mucous membranes are dry.   Eyes:      General:         Right eye: No discharge.         Left eye: No discharge.      Extraocular Movements: Extraocular movements intact.   Cardiovascular:      Rate and Rhythm: Normal rate and regular rhythm.      Pulses: Normal pulses.   Pulmonary:      Effort: Pulmonary effort is normal. No respiratory distress.   Abdominal:      General: There is no distension.      Palpations: Abdomen is soft.   Musculoskeletal:         General: Normal range of motion.      Cervical back: Normal range of motion and neck supple.      Comments: To the mid back, there is swelling consistent with sebaceous cyst.  There was a smaller version of this to the left side.  This was confirmed with patient and his wife.   Skin:     General: Skin is warm and dry.   Neurological:      General: No focal deficit present.      Mental Status: He is alert and oriented to person, place, and time.   Psychiatric:         Mood and Affect: Mood normal.         Behavior: Behavior normal.         Significant Labs:  I have reviewed all pertinent lab results within the past 24 hours.  CBC:   Recent Labs   Lab 06/27/24  0937   WBC 5.84   RBC 4.53*   HGB 12.3*   HCT 35.8*      MCV 79.0*   MCH 27.2   MCHC 34.4     CMP:   Recent Labs   Lab 06/27/24  0937   CALCIUM 9.5   ALBUMIN 3.7      K 4.2   CO2 27      BUN 18.0   CREATININE 1.00   ALKPHOS 114   ALT 9   AST 15   BILITOT 0.4     Coagulation:   Recent Labs   Lab 06/27/24  0937   LABPROT 8.2*       Significant Diagnostics:  I have reviewed all pertinent imaging  results/findings within the past 24 hours.    Assessment/Plan:     There are no hospital problems to display for this patient.    VTE Risk Mitigation (From admission, onward)           Ordered     IP VTE HIGH RISK PATIENT  Once         07/01/24 1007     Place sequential compression device  Until discontinued         07/01/24 1007                68-year-old gentleman with sebaceous cyst of the back x2.  The 1 especially in the midportion of the back has been bothersome and also growing.  It also was symptomatic and that it ruptures once a year.  Plan for:  1. Excision sebaceous cyst of the back x2 risks benefits and alternatives previously discussed, and he would like to proceed.  Follow-up 1 week.    I appreciate the consultation and the opportunity to be involved in Mr. Mclain's care.    Estee Tracy MD  General Surgery  [facility] - Peri Services

## 2024-07-01 NOTE — ANESTHESIA POSTPROCEDURE EVALUATION
Anesthesia Post Evaluation    Patient: Zeferino Mclain Jr.    Procedure(s) Performed: Procedure(s) (LRB):  EXCISION, CYST (Mid back) (N/A)    Final Anesthesia Type: general      Patient participation: Yes- Able to Participate  Level of consciousness: awake and alert and oriented  Post-procedure vital signs: reviewed and stable  Pain management: adequate  Airway patency: patent    PONV status at discharge: No PONV  Anesthetic complications: no      Cardiovascular status: stable  Respiratory status: unassisted, spontaneous ventilation and room air  Hydration status: euvolemic  Follow-up not needed.              Vitals Value Taken Time     07/01/24 1339     07/01/24 1339     07/01/24 1339     07/01/24 1339     07/01/24 1339         No case tracking events are documented in the log.      Pain/Breanna Score: No data recorded

## 2024-07-01 NOTE — OP NOTE
DATE OF PROCEDURE 7/1/2024     PREOPERATIVE DIAGNOSIS -   Sebaceous cyst [L72.3] of the back    POSTOPERATIVE DIAGNOSIS -   Sebaceous cyst [L72.3] of the back    PROCEDURE - excision sebaceous cyst of the mid back x2    SURGEON - Jennifer Jean MD    ANESTHESIA - local with sedation    FINDINGS -   1. Excision mid back sebaceous cyst, total dimension 4 x 3 x 1.5 cm.  Completely removed.  2. Excision left mid back sebaceous cyst, total dimension 1.5 x 1 x 1 cm.  Completely removed.      SPECIMENS -   Sebaceous cyst mid back   Sebaceous cyst left mid back    OPERATIVE REPORT -   Patient was brought to the OR, placed in supine position.  Anesthesia was provided.  He was positioned in decubitus position with left side up.  The mid back was prepped and draped in sterile manner.  Time-out was carried out and agreed upon.  Local anesthetic was provided.  Fifteen blade scalpel was used to make an elliptical incision surrounding sebaceous cyst of the midback more centrally located.  Subcutaneous tissue was dissected.  A combination of tenotomy scissors and cautery were used to circumferentially dissect the cystic hand.  This was submitted to pathology.  The wound was irrigated.  Hemostasis was ensured.  Skin flaps were created by about a cm superiorly and inferiorly.  The dermis was reapproximated with 3-0 Vicryl suture in a simple, inverted, interrupted manner.  The skin was closed with 4-0 Prolene in a simple, interrupted manner x4 sutures.    Next, attention was addressed to the small sebaceous cyst at the left mid back.  This was performed in a manner similar to the 1st and closed in a similar manner to the 1st with a smaller number of sutures.    Sterile dressings were applied.  The patient tolerated the procedure well without complication.

## 2024-07-01 NOTE — BRIEF OP NOTE
[facility] - Periop Services  Brief Operative Note    Surgery Date: 7/1/2024     Surgeons and Role:     * FLACO Champagne MD - Primary    Assisting Surgeon: None    Pre-op Diagnosis:  Sebaceous cyst [L72.3] to the back    Post-op Diagnosis:  Post-Op Diagnosis Codes:     * Sebaceous cyst [L72.3] to the back    Procedure:   Excision sebaceous cyst of the mid back x2    Anesthesia:  Local with sedation    Operative Findings:   1. Excision mid back sebaceous cyst, total dimension 4 x 3 x 1.5 cm.  Completely removed.  2. Excision left mid back sebaceous cyst, total dimension 1.5 x 1 x 1 cm.  Completely removed.      Estimated Blood Loss:  Minimal         Specimens:   Specimen (24h ago, onward)       Start     Ordered    07/01/24 1321  Specimen to Pathology  RELEASE UPON ORDERING        References:    Click here for ordering Quick Tip   Question:  Release to patient  Answer:  Immediate    07/01/24 1321                      Discharge Note    OUTCOME: Patient tolerated treatment/procedure well without complication and is now ready for discharge.    DISPOSITION: Home or Self Care    FINAL DIAGNOSIS:  <principal problem not specified>    FOLLOWUP: In clinic    DISCHARGE INSTRUCTIONS:  No discharge procedures on file.     Clinical Reference Documents Added to Patient Instructions         Document    WOUND CARE DISCHARGE INSTRUCTIONS (ENGLISH)

## 2024-07-02 LAB — PSYCHE PATHOLOGY RESULT: NORMAL

## 2024-07-03 ENCOUNTER — PATIENT MESSAGE (OUTPATIENT)
Dept: ADMINISTRATIVE | Facility: OTHER | Age: 69
End: 2024-07-03
Payer: OTHER GOVERNMENT

## 2024-10-04 ENCOUNTER — ANESTHESIA EVENT (OUTPATIENT)
Dept: SURGERY | Facility: HOSPITAL | Age: 69
End: 2024-10-04
Payer: OTHER GOVERNMENT

## 2024-10-04 ENCOUNTER — HOSPITAL ENCOUNTER (OUTPATIENT)
Facility: HOSPITAL | Age: 69
Discharge: HOME | End: 2024-10-04
Attending: SURGERY | Admitting: SURGERY
Payer: OTHER GOVERNMENT

## 2024-10-04 ENCOUNTER — ANESTHESIA (OUTPATIENT)
Dept: SURGERY | Facility: HOSPITAL | Age: 69
End: 2024-10-04
Payer: OTHER GOVERNMENT

## 2024-10-04 VITALS
BODY MASS INDEX: 25.72 KG/M2 | RESPIRATION RATE: 18 BRPM | HEART RATE: 53 BPM | SYSTOLIC BLOOD PRESSURE: 118 MMHG | DIASTOLIC BLOOD PRESSURE: 67 MMHG | TEMPERATURE: 98 F | OXYGEN SATURATION: 98 % | WEIGHT: 160.06 LBS | HEIGHT: 66 IN

## 2024-10-04 DIAGNOSIS — C20 RECTAL CANCER: ICD-10-CM

## 2024-10-04 PROCEDURE — 37000008 HC ANESTHESIA 1ST 15 MINUTES: Performed by: SURGERY

## 2024-10-04 PROCEDURE — 37000009 HC ANESTHESIA EA ADD 15 MINS: Performed by: SURGERY

## 2024-10-04 PROCEDURE — 63600175 PHARM REV CODE 636 W HCPCS: Performed by: ANESTHESIOLOGY

## 2024-10-04 PROCEDURE — 45378 DIAGNOSTIC COLONOSCOPY: CPT | Performed by: SURGERY

## 2024-10-04 PROCEDURE — 63600175 PHARM REV CODE 636 W HCPCS: Performed by: NURSE ANESTHETIST, CERTIFIED REGISTERED

## 2024-10-04 RX ORDER — SODIUM CHLORIDE, SODIUM LACTATE, POTASSIUM CHLORIDE, CALCIUM CHLORIDE 600; 310; 30; 20 MG/100ML; MG/100ML; MG/100ML; MG/100ML
INJECTION, SOLUTION INTRAVENOUS CONTINUOUS
Status: DISCONTINUED | OUTPATIENT
Start: 2024-10-04 | End: 2024-10-04 | Stop reason: HOSPADM

## 2024-10-04 RX ORDER — PROPOFOL 10 MG/ML
VIAL (ML) INTRAVENOUS
Status: DISCONTINUED | OUTPATIENT
Start: 2024-10-04 | End: 2024-10-04

## 2024-10-04 RX ORDER — FENTANYL CITRATE 50 UG/ML
INJECTION, SOLUTION INTRAMUSCULAR; INTRAVENOUS
Status: DISCONTINUED | OUTPATIENT
Start: 2024-10-04 | End: 2024-10-04

## 2024-10-04 RX ORDER — LIDOCAINE HYDROCHLORIDE 20 MG/ML
INJECTION, SOLUTION EPIDURAL; INFILTRATION; INTRACAUDAL; PERINEURAL
Status: DISCONTINUED | OUTPATIENT
Start: 2024-10-04 | End: 2024-10-04

## 2024-10-04 RX ADMIN — PROPOFOL 40 MG: 10 INJECTION, EMULSION INTRAVENOUS at 09:10

## 2024-10-04 RX ADMIN — SODIUM CHLORIDE, POTASSIUM CHLORIDE, SODIUM LACTATE AND CALCIUM CHLORIDE: 600; 310; 30; 20 INJECTION, SOLUTION INTRAVENOUS at 08:10

## 2024-10-04 RX ADMIN — LIDOCAINE HYDROCHLORIDE 5 ML: 20 INJECTION, SOLUTION EPIDURAL; INFILTRATION; INTRACAUDAL; PERINEURAL at 09:10

## 2024-10-04 RX ADMIN — FENTANYL CITRATE 100 MCG: 50 INJECTION, SOLUTION INTRAMUSCULAR; INTRAVENOUS at 09:10

## 2024-10-04 NOTE — OP NOTE
10/04/2024    Preoperative Diagnosis:  Large adenomatous polyp of the rectum with high-grade dysplasia  Status post transanal minimally invasive surgery    Postoperative Diagnosis:  Large adenomatous polyp of the rectum with high-grade dysplasia  Status post transanal minimally invasive surgery    Procedure:  Colonoscopy with intubation of the ileocecal valve to the terminal ileum    Findings:  Digital rectal exam no mass lesions palpable, no visualized prolapsed hemorrhoids  Prep fair to good.  There were some areas where thick mucus required washing to visualize the walls, but mucosa was felt to be well visualized   Appendiceal orifice and ileocecal valve both identified.  The ileocecal valve was intubated with the evaluation of the distal most 8 cm of the terminal ileum.  This was normal.  Normal cecum, ascending colon, transverse colon, and descending colon.  Some slight narrowing of the sigmoid colon without obstruction or stricture.  Rectum normal with scar noted from previous TAMIS procedure.  No suggestion of recurrence.    Retroflexion demonstrated irritated internal hemorrhoids, grade 2    Procedure:  Patient was brought to the endoscopy suite and placed in left lateral decubitus position.  Sedation was provided via IV.  Time-out was carried out and agreed upon.  Digital rectal exam was performed with findings as noted above.  Scope was advanced through the anorectum into the colon up to the cecum.  The ileocecal valve and appendiceal orifice were identified.  The ileocecal valve was intubated with the evaluation of the distal most aspect of 8 cm of terminal ileum.  Normal mucosa was found.  The scope was retrieved into the cecum.  The walls of the colon were evaluated under distention.  Normal mucosa was visualized of the cecum, the ascending colon, transverse colon, and descending colon.  There was mild narrowing of the sigmoid colon without obstruction or stricture.  The rectum was normal with scarring  identified at previous TAMIS procedure site.  Some residual tattoo was present, as well.  Retroflexion demonstrated hemorrhoidal bundles in appropriate position.  He had grade 2 hemorrhoids that were somewhat irritated on visualization.  Otherwise no complication.  The scope was retrieved.    Patient tolerated the procedure well without complication.

## 2024-10-04 NOTE — ANESTHESIA PREPROCEDURE EVALUATION
10/04/2024  Zeferino Mclain Jr. is a 69 y.o., male.      Pre-op Assessment    I have reviewed the Patient Summary Reports.     I have reviewed the Nursing Notes. I have reviewed the NPO Status.   I have reviewed the Medications.     Review of Systems  Anesthesia Hx:             Denies Family Hx of Anesthesia complications.    Denies Personal Hx of Anesthesia complications.                    Social:  Smoker, Alcohol Use       Hematology/Oncology:  Hematology Normal   Oncology Normal                                   EENT/Dental:  EENT/Dental Normal           Cardiovascular:  Cardiovascular Normal                  ECG has been reviewed.                          Pulmonary:  Pulmonary Normal                       Renal/:  Renal/ Normal                 Hepatic/GI:  Hepatic/GI Normal                 Musculoskeletal:  Musculoskeletal Normal                Neurological:  Neurology Normal                                      Endocrine:  Endocrine Normal            Dermatological:  Skin Normal    Psych:  Psychiatric Normal                    Physical Exam  General: Cooperative, Alert and Oriented    Airway:  Mallampati: II   Mouth Opening: Normal  TM Distance: Normal  Tongue: Normal  Neck ROM: Normal ROM    Dental:  Intact        Anesthesia Plan  Type of Anesthesia, risks & benefits discussed:    Anesthesia Type: Gen Natural Airway  Intra-op Monitoring Plan: Standard ASA Monitors  Post Op Pain Control Plan: multimodal analgesia  Induction:  IV  Informed Consent: Informed consent signed with the Patient and all parties understand the risks and agree with anesthesia plan.  All questions answered. Patient consented to blood products? Yes  ASA Score: 2    Ready For Surgery From Anesthesia Perspective.     .

## 2024-10-04 NOTE — ANESTHESIA POSTPROCEDURE EVALUATION
Anesthesia Post Evaluation    Patient: Zeferino Mclain Jr.    Procedure(s) Performed: Procedure(s) (LRB):  COLONOSCOPY (N/A)    Final Anesthesia Type: general      Patient location during evaluation: OPS  Patient participation: Yes- Able to Participate  Level of consciousness: awake and alert  Post-procedure vital signs: reviewed and stable  Pain management: adequate  Airway patency: patent  GUILLERMO mitigation strategies: Multimodal analgesia  PONV status at discharge: No PONV  Anesthetic complications: no      Cardiovascular status: hemodynamically stable  Respiratory status: unassisted, room air and spontaneous ventilation  Hydration status: euvolemic  Follow-up not needed.              Vitals Taken Time   BP 10/04/24 1005   Temp 10/04/24 1005   Pulse 10/04/24 1005   Resp 10/04/24 1005   SpO2 10/04/24 1005         No case tracking events are documented in the log.      Pain/Breanna Score: No data recorded

## 2024-10-16 ENCOUNTER — PATIENT MESSAGE (OUTPATIENT)
Dept: RESEARCH | Facility: HOSPITAL | Age: 69
End: 2024-10-16
Payer: OTHER GOVERNMENT

## 2024-10-17 ENCOUNTER — PATIENT MESSAGE (OUTPATIENT)
Dept: RESEARCH | Facility: HOSPITAL | Age: 69
End: 2024-10-17
Payer: OTHER GOVERNMENT

## 2024-10-25 ENCOUNTER — PATIENT MESSAGE (OUTPATIENT)
Dept: RESEARCH | Facility: HOSPITAL | Age: 69
End: 2024-10-25
Payer: OTHER GOVERNMENT

## 2024-12-05 ENCOUNTER — OFFICE VISIT (OUTPATIENT)
Dept: HEMATOLOGY/ONCOLOGY | Facility: CLINIC | Age: 69
End: 2024-12-05
Payer: OTHER GOVERNMENT

## 2024-12-05 VITALS
HEART RATE: 53 BPM | HEIGHT: 66 IN | SYSTOLIC BLOOD PRESSURE: 176 MMHG | BODY MASS INDEX: 26.38 KG/M2 | WEIGHT: 164.13 LBS | OXYGEN SATURATION: 98 % | RESPIRATION RATE: 16 BRPM | DIASTOLIC BLOOD PRESSURE: 99 MMHG

## 2024-12-05 DIAGNOSIS — Z85.048 HISTORY OF RECTAL CANCER: Primary | ICD-10-CM

## 2024-12-05 DIAGNOSIS — D50.0 ANEMIA DUE TO CHRONIC BLOOD LOSS: ICD-10-CM

## 2024-12-05 LAB
% SATURATION: 32 % (ref 20–50)
ALBUMIN SERPL BCP-MCNC: 3.7 G/DL (ref 3.4–5)
ALBUMIN/GLOBULIN RATIO: 0.88 RATIO (ref 1.1–1.8)
ALP SERPL-CCNC: 119 U/L (ref 46–116)
ALT SERPL W P-5'-P-CCNC: 24 U/L (ref 12–78)
ANION GAP SERPL CALC-SCNC: 3 MMOL/L (ref 3–11)
AST SERPL-CCNC: 19 U/L (ref 15–37)
BASOPHILS NFR BLD: 1.3 % (ref 0–3)
BILIRUB SERPL-MCNC: 0.4 MG/DL (ref 0–1)
BUN SERPL-MCNC: 25 MG/DL (ref 7–18)
BUN/CREAT SERPL: 21.55 RATIO (ref 7–18)
CALCIUM SERPL-MCNC: 9 MG/DL (ref 8.8–10.5)
CHLORIDE SERPL-SCNC: 102 MMOL/L (ref 100–108)
CO2 SERPL-SCNC: 32 MMOL/L (ref 21–32)
CREAT SERPL-MCNC: 1.16 MG/DL (ref 0.7–1.3)
EOSINOPHIL NFR BLD: 1.8 % (ref 1–3)
ERYTHROCYTE [DISTWIDTH] IN BLOOD BY AUTOMATED COUNT: 14.4 % (ref 12.5–18)
FERRITIN SERPL-MCNC: 752 NG/ML (ref 8–388)
GFR ESTIMATION: 69
GLOBULIN: 4.2 G/DL (ref 2.3–3.5)
GLUCOSE SERPL-MCNC: 112 MG/DL (ref 70–110)
HCT VFR BLD AUTO: 37.1 % (ref 42–52)
HGB BLD-MCNC: 13 G/DL (ref 14–18)
IRON: 73 UG/DL (ref 36–167)
LYMPHOCYTES NFR BLD: 18.4 % (ref 25–40)
MCH RBC QN AUTO: 27.6 PG (ref 27–31.2)
MCHC RBC AUTO-ENTMCNC: 35 G/DL (ref 31.8–35.4)
MCV RBC AUTO: 78.8 FL (ref 80–97)
MONOCYTES NFR BLD: 11.3 % (ref 1–15)
NEUTROPHILS # BLD AUTO: 4.08 10*3/UL (ref 1.8–7.7)
NEUTROPHILS NFR BLD: 66.9 % (ref 37–80)
NUCLEATED RED BLOOD CELLS: 0 %
PLATELETS: 202 10*3/UL (ref 142–424)
POTASSIUM SERPL-SCNC: 4.3 MMOL/L (ref 3.6–5.2)
PROT SERPL-MCNC: 7.9 G/DL (ref 6.4–8.2)
RBC # BLD AUTO: 4.71 10*6/UL (ref 4.7–6.1)
SODIUM BLD-SCNC: 137 MMOL/L (ref 135–145)
TOTAL IRON BINDING CAPACITY: 231 UG/DL (ref 250–450)
WBC # BLD: 6.1 10*3/UL (ref 4.6–10.2)

## 2024-12-05 RX ORDER — FERROUS SULFATE, DRIED 160(50) MG
1 TABLET, EXTENDED RELEASE ORAL DAILY
COMMUNITY

## 2024-12-05 NOTE — PROGRESS NOTES
MEDICAL ONCOLOGY FOLLOW UP CONSULTATION NOTE    Patient ID: Zeferino Mclain Jr. is a 69 y.o. male.    Chief Complaint:  Rectal Cancer arising from a polyp    HPI:  Patient is a 68-year-old male former smoker with recent diagnosis of rectal cancer after he underwent screening colonoscopy which showed findings as below.  Patient presents to our clinic today for further evaluation voices no acute complaints.        Pathology:  06/28/2023     A. Pedunculated polyp at 25 cm:  Tubulovillous adenoma    B. Polyp at 2nd wall rectum:   Adenocarcinoma, moderately differentiated    Synoptic report:   Histologic Type adenocarcinoma  Histologic grade:  G2, moderately differentiated  Tumor size: 2 mm  Tumor extent:  Invades lamina propria  Lymphovascular invasion: Not identified  All margins negative for invasive carcinoma    Mismatch repair (MMR) IHC panel:  Mismatch repair (MMR) IHC panel:  MLH1, MSH2, MSH6 and PMS2 all show intact nuclear expression by immunohistochemistry.  There is no evidence of mismatch repair deficiency in this specimen      Imaging:     PET scan:  06/27/2023  Multiple abnormal lung nodules in the upper right paramidline chest medially and 1 in the lower lateral aspect of the right middle lung.  They all show abnormal tracer uptake.  Differential diagnosis may include immature granuloma but malignancy is not excluded  There are no abnormal areas of tracer uptake to suggest malignancy or metastatic disease      Past Medical History:   Diagnosis Date    Allergy     Anxiety     Rectal cancer     Sebaceous cyst 07/01/2024     Family History   Problem Relation Name Age of Onset    Dementia Mother      Lung cancer Father      Breast cancer Sister       Social History     Socioeconomic History    Marital status:    Tobacco Use    Smoking status: Former     Current packs/day: 0.00     Average packs/day: 0.5 packs/day for 30.0 years (15.0 ttl pk-yrs)     Types: Cigarettes     Start date: 02/1992     Quit  date: 2022     Years since quittin.8     Passive exposure: Past    Smokeless tobacco: Never   Substance and Sexual Activity    Alcohol use: Yes     Comment: SOCIAL    Drug use: Never    Sexual activity: Yes     Partners: Female     Past Surgical History:   Procedure Laterality Date    COLONOSCOPY      COLONOSCOPY N/A 10/4/2024    Procedure: COLONOSCOPY;  Surgeon: FLACO Champagne MD;  Location: [facility];  Service: Endoscopy;  Laterality: N/A;  Post-op: Normal colon    CYST REMOVAL N/A 2024    Procedure: EXCISION, CYST (Mid back);  Surgeon: FLACO Champagne MD;  Location: [facility];  Service: General;  Laterality: N/A;  X2    ESOPHAGOGASTRODUODENOSCOPY      FLEXIBLE SIGMOIDOSCOPY N/A 2023    Procedure: SIGMOIDOSCOPY, FLEXIBLE;  Surgeon: FLACO Champagne MD;  Location: [facility];  Service: Endoscopy;  Laterality: N/A;  Post op: rectal polyp w/rectal cancer    FLEXIBLE SIGMOIDOSCOPY  2023    Procedure: SIGMOIDOSCOPY, FLEXIBLE;  Surgeon: FLACO Champagne MD;  Location: [facility];  Service: General;;    FLEXIBLE SIGMOIDOSCOPY N/A 2024    Procedure: SIGMOIDOSCOPY, FLEXIBLE;  Surgeon: FLACO Champagne MD;  Location: [facility];  Service: Endoscopy;  Laterality: N/A;  POST-OP: NO RECURRENCE    LUNG BIOPSY      ROBOTIC TAMIS (TRANSANAL MINIMALLY INVASIVE SURGERY) N/A 2023    Procedure: ROBOTIC TAMIS (TRANSANAL MINIMALLY INVASIVE SURGERY);  Surgeon: FLACO Champagne MD;  Location: [facility];  Service: General;  Laterality: N/A;    SIGMOIDOSCOPY, WITH BIOPSY N/A 2024    Procedure: SIGMOIDOSCOPY, WITH BIOPSY;  Surgeon: FLACO Champagne MD;  Location: [facility];  Service: Endoscopy;  Laterality: N/A;  POST-OP: LOW RECTUM BIOPSY         Review of systems:  Review of Systems   Constitutional:  Negative for activity change, appetite change, chills, diaphoresis, fatigue and unexpected weight change.   HENT:  Negative for congestion, facial swelling,  hearing loss, mouth sores, trouble swallowing and voice change.    Eyes:  Negative for photophobia, pain, discharge and itching.   Respiratory:  Negative for apnea, cough, choking, chest tightness and shortness of breath.    Cardiovascular:  Negative for chest pain, palpitations and leg swelling.   Gastrointestinal:  Negative for abdominal distention, abdominal pain, anal bleeding and blood in stool.   Endocrine: Negative for cold intolerance, heat intolerance, polydipsia and polyphagia.   Genitourinary:  Negative for difficulty urinating, dysuria, flank pain and hematuria.   Musculoskeletal:  Negative for arthralgias, back pain, joint swelling, myalgias, neck pain and neck stiffness.   Skin:  Negative for color change, pallor and wound.   Allergic/Immunologic: Negative for environmental allergies, food allergies and immunocompromised state.   Neurological:  Negative for dizziness, seizures, facial asymmetry, speech difficulty, light-headedness, numbness and headaches.   Hematological:  Negative for adenopathy. Does not bruise/bleed easily.   Psychiatric/Behavioral:  Negative for agitation, behavioral problems, confusion, decreased concentration and sleep disturbance.              Physical Exam  Vitals and nursing note reviewed.   Constitutional:       General: He is not in acute distress.     Appearance: Normal appearance. He is not ill-appearing.   HENT:      Head: Normocephalic and atraumatic.      Nose: No congestion or rhinorrhea.   Eyes:      General: No scleral icterus.     Extraocular Movements: Extraocular movements intact.      Pupils: Pupils are equal, round, and reactive to light.   Cardiovascular:      Rate and Rhythm: Normal rate and regular rhythm.      Pulses: Normal pulses.      Heart sounds: Normal heart sounds. No murmur heard.     No gallop.   Pulmonary:      Effort: Pulmonary effort is normal. No respiratory distress.      Breath sounds: Normal breath sounds. No stridor. No wheezing or rhonchi.    Abdominal:      General: Bowel sounds are normal. There is no distension.      Palpations: There is no mass.      Tenderness: There is no abdominal tenderness. There is no guarding.   Musculoskeletal:         General: No swelling, tenderness, deformity or signs of injury. Normal range of motion.      Cervical back: Normal range of motion and neck supple. No rigidity. No muscular tenderness.      Right lower leg: No edema.      Left lower leg: No edema.   Skin:     General: Skin is warm.      Coloration: Skin is not jaundiced or pale.      Findings: No bruising or lesion.   Neurological:      General: No focal deficit present.      Mental Status: He is alert and oriented to person, place, and time.      Cranial Nerves: No cranial nerve deficit.      Sensory: No sensory deficit.      Motor: No weakness.      Gait: Gait normal.   Psychiatric:         Mood and Affect: Mood normal.         Behavior: Behavior normal.         Thought Content: Thought content normal.                                 Vitals:    12/05/24 1009   BP: (!) 176/99   Pulse: (!) 53   Resp: 16       Body surface area is 1.86 meters squared.    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  No high-risk features and no lymphovascular invasion. pT1  == Discussed with patient discussed with patient in detail management options for pedunculated polyp with invasive cancer component and recommendation for observation if favorable histologic features and clear margins pT1 on NCCN guidelines.  == 9/5/23: Patient  discussed in multidisciplinary tumor board and the consensus decision to continue with observation with close surveillance as far as adenocarcinoma from the rectal polyp is concerned.  He needs to be evaluated for incidental pulmonary nodules likely secondary to inflammatory/infectious process noted on imaging and follows with pulmonary.  == 12/5/23: doing well with  no new c/o. Has upcoming appointment with Dr Tracy next week for routine f/u. He denies any melena, abdominal pain or changes in bowel habits. Labs reviewed anemia improving No CEA available today  ==03/19/2024: Labs drawn same day of appt, no cea available. CEA from December 2023 was 1.0.  Pt is due for repeat colonoscopy and is scheduled for one with Dr. JAYLYN Puckett on 3/28/2024.  He had MRI pelvis in December 2023 which did not show any evidence of residual or recurrent disease. Plan to continue observation  ==06/11/2024: Pt saw Dr. JAYLYN Latif on 5/5/24 and had proctoscopy, has colonoscopy scheduled in September. Cotninued mild anemia hgb 12.2, will check iron studies with next labs, denies fatigue, no dizziness or dyspnea.   ==12/05/2024: Pt had cscope with Dr. Latif was normal with plans to repeat in 2 years.  On po iron, hgb 13.0 reports improvement with fatige since starting iron also on vit d. Labs reviewed, patient clinically HERMAN, MRI has been ordered per Dr. Latif. Will continue observation    2. Lung nodule, multiple  - S/p bronchoscopy, no evidence of malignancy.  ? If MAC is active disease versus colonization.  Given that patient is asymptomatic would be reasonable to observe for now per Dr Trevizo's recommendation  - quit smoking 2 years ago     3. Anemia  ==Mild, asymptomatic  ==will check iron studies    Plan:   Continue observation  Cbc cmp, cea, iron studies  Continue to follow up with Dr. Puckett, colonoscopy scheduled sept 2024      Total time spent in counseling and discussion about further management options including relevant lab work, treatment,  prognosis, medications and intended side effects was more than 30 minutes. More than 50% of the time was spent on counseling and coordination of care.  I spent a total of 30 minutes on the day of the visit.This includes face to face time and non-face to face time preparing to see the patient (eg, review of tests), Obtaining  and/or reviewing separately obtained history, Documenting clinical information in the electronic or other health record, Independently interpreting resultsand communicating results to the patient/family/caregiver, or Care coordination.

## 2024-12-08 LAB — CEA SERPL-MCNC: 1 NG/ML

## 2025-06-04 ENCOUNTER — OFFICE VISIT (OUTPATIENT)
Dept: HEMATOLOGY/ONCOLOGY | Facility: CLINIC | Age: 70
End: 2025-06-04
Payer: OTHER GOVERNMENT

## 2025-06-04 VITALS
SYSTOLIC BLOOD PRESSURE: 157 MMHG | HEART RATE: 54 BPM | BODY MASS INDEX: 26.07 KG/M2 | OXYGEN SATURATION: 98 % | RESPIRATION RATE: 16 BRPM | DIASTOLIC BLOOD PRESSURE: 76 MMHG | HEIGHT: 66 IN | WEIGHT: 162.19 LBS

## 2025-06-04 DIAGNOSIS — Z85.048 HISTORY OF RECTAL CANCER: Primary | ICD-10-CM

## 2025-06-04 DIAGNOSIS — D50.0 ANEMIA DUE TO CHRONIC BLOOD LOSS: ICD-10-CM

## (undated) DEVICE — GLOVE SENSICARE NEOPRENE 7.5

## (undated) DEVICE — SUT SILK 2.0 BLK 18

## (undated) DEVICE — OBTURATOR BLADELESS 8MM XI CLR

## (undated) DEVICE — GOWN POLY REINF BRTH SLV XL

## (undated) DEVICE — Device

## (undated) DEVICE — TUBING INSUF .1 MIC FLTR 10FT

## (undated) DEVICE — GLOVE PROTEXIS BLUE LATEX 8.5

## (undated) DEVICE — SCISSOR HOT SHEARS XI

## (undated) DEVICE — DRAPE UND BUTT W/POUCH 40X44IN

## (undated) DEVICE — GLOVE SENSICARE PI GRN 7

## (undated) DEVICE — COVER TIP CURVED SCISSORS XI

## (undated) DEVICE — SUT VLOC 180 3-0 GRN B-20

## (undated) DEVICE — KIT SURGICAL COLON .25 1.1OZ

## (undated) DEVICE — IRRIGATOR ENDOWRIST XI SUCTION

## (undated) DEVICE — TRAY CATH FOL SIL DRN BAG 16FR

## (undated) DEVICE — SUT SILK 2-0 SH 18IN BLACK

## (undated) DEVICE — GLOVE PROTEXIS BLUE LATEX 7

## (undated) DEVICE — SEALER VESSEL EXTEND

## (undated) DEVICE — GLOVE SENSICARE NEOPRENE 6.5

## (undated) DEVICE — SUT PROLENE 4-0 36 V-5 V.5

## (undated) DEVICE — FORCEP MARYLAND BIPOLAR

## (undated) DEVICE — PAD PINK TRENDELENBURG POS XL

## (undated) DEVICE — NDL HYPO POLYPR STD 26G 1.5IN

## (undated) DEVICE — SPONGE GAUZE 16PLY 4X4

## (undated) DEVICE — SOL CLEARIFY VISUALIZATION LAP

## (undated) DEVICE — DRAPE COLUMN DAVINCI XI

## (undated) DEVICE — SOL IRR SOD CHL .9% POUR

## (undated) DEVICE — GLOVE PROTEXIS HYDROGEL SZ6.5

## (undated) DEVICE — PAD ELECTROSURGICAL SPL W/CORD

## (undated) DEVICE — BLANKET SNUGGLE WARM UPPER BDY

## (undated) DEVICE — SEAL UNIVERSAL 5MM-8MM XI

## (undated) DEVICE — SET TUB INSUFFLATION SUC 20L

## (undated) DEVICE — GLOVE SIGNATURE ESSNTL LTX 6.5

## (undated) DEVICE — FORCEP CADIERE DA VINCI

## (undated) DEVICE — FORCEP CAPTURA PRO SPK 230CM

## (undated) DEVICE — DRIVER NEEDLE SUTURECUT MEGA

## (undated) DEVICE — KIT GELPOINT TRNSANL ACC 5.5CM

## (undated) DEVICE — DRAPE ARM DAVINCI XI

## (undated) DEVICE — FORCEP FENESTRATED BIPOLAR

## (undated) DEVICE — SUT CTD VICRYL VIL BR SH 27

## (undated) DEVICE — SPONGE LAP 18X18 PREWASHED

## (undated) DEVICE — TRAY SKIN SCRUB WET PREMIUM

## (undated) DEVICE — DRAPE LEGGINGS CUFF 33X51IN